# Patient Record
Sex: FEMALE | Race: WHITE | NOT HISPANIC OR LATINO
[De-identification: names, ages, dates, MRNs, and addresses within clinical notes are randomized per-mention and may not be internally consistent; named-entity substitution may affect disease eponyms.]

---

## 2020-11-17 ENCOUNTER — TRANSCRIPTION ENCOUNTER (OUTPATIENT)
Age: 25
End: 2020-11-17

## 2020-12-02 ENCOUNTER — TRANSCRIPTION ENCOUNTER (OUTPATIENT)
Age: 25
End: 2020-12-02

## 2020-12-27 ENCOUNTER — TRANSCRIPTION ENCOUNTER (OUTPATIENT)
Age: 25
End: 2020-12-27

## 2021-03-05 ENCOUNTER — INPATIENT (INPATIENT)
Facility: HOSPITAL | Age: 26
LOS: 2 days | Discharge: HOME | DRG: 694 | End: 2021-03-08
Attending: FAMILY MEDICINE | Admitting: FAMILY MEDICINE
Payer: COMMERCIAL

## 2021-03-05 VITALS — WEIGHT: 110.01 LBS | HEIGHT: 63 IN

## 2021-03-05 DIAGNOSIS — N20.0 CALCULUS OF KIDNEY: ICD-10-CM

## 2021-03-05 DIAGNOSIS — Z90.89 ACQUIRED ABSENCE OF OTHER ORGANS: Chronic | ICD-10-CM

## 2021-03-05 LAB
ABO RH CONFIRMATION: SIGNIFICANT CHANGE UP
ALBUMIN SERPL ELPH-MCNC: 4.4 G/DL — SIGNIFICANT CHANGE UP (ref 3.3–5)
ALP SERPL-CCNC: 96 U/L — SIGNIFICANT CHANGE UP (ref 40–120)
ALT FLD-CCNC: 18 U/L — SIGNIFICANT CHANGE UP (ref 12–78)
ANION GAP SERPL CALC-SCNC: 9 MMOL/L — SIGNIFICANT CHANGE UP (ref 5–17)
APPEARANCE UR: ABNORMAL
APTT BLD: 30.2 SEC — SIGNIFICANT CHANGE UP (ref 27.5–35.5)
AST SERPL-CCNC: 12 U/L — LOW (ref 15–37)
BASOPHILS # BLD AUTO: 0.09 K/UL — SIGNIFICANT CHANGE UP (ref 0–0.2)
BASOPHILS NFR BLD AUTO: 0.5 % — SIGNIFICANT CHANGE UP (ref 0–2)
BILIRUB SERPL-MCNC: 0.6 MG/DL — SIGNIFICANT CHANGE UP (ref 0.2–1.2)
BILIRUB UR-MCNC: NEGATIVE — SIGNIFICANT CHANGE UP
BUN SERPL-MCNC: 10 MG/DL — SIGNIFICANT CHANGE UP (ref 7–23)
CALCIUM SERPL-MCNC: 9.4 MG/DL — SIGNIFICANT CHANGE UP (ref 8.5–10.1)
CHLORIDE SERPL-SCNC: 105 MMOL/L — SIGNIFICANT CHANGE UP (ref 96–108)
CO2 SERPL-SCNC: 24 MMOL/L — SIGNIFICANT CHANGE UP (ref 22–31)
COLOR SPEC: YELLOW — SIGNIFICANT CHANGE UP
CREAT SERPL-MCNC: 0.9 MG/DL — SIGNIFICANT CHANGE UP (ref 0.5–1.3)
DIFF PNL FLD: ABNORMAL
EOSINOPHIL # BLD AUTO: 0.11 K/UL — SIGNIFICANT CHANGE UP (ref 0–0.5)
EOSINOPHIL NFR BLD AUTO: 0.7 % — SIGNIFICANT CHANGE UP (ref 0–6)
GLUCOSE SERPL-MCNC: 119 MG/DL — HIGH (ref 70–99)
GLUCOSE UR QL: NEGATIVE MG/DL — SIGNIFICANT CHANGE UP
HCT VFR BLD CALC: 45.4 % — HIGH (ref 34.5–45)
HGB BLD-MCNC: 15.8 G/DL — HIGH (ref 11.5–15.5)
IMM GRANULOCYTES NFR BLD AUTO: 0.4 % — SIGNIFICANT CHANGE UP (ref 0–1.5)
INR BLD: 1.12 RATIO — SIGNIFICANT CHANGE UP (ref 0.88–1.16)
KETONES UR-MCNC: ABNORMAL
LACTATE SERPL-SCNC: 1.1 MMOL/L — SIGNIFICANT CHANGE UP (ref 0.7–2)
LEUKOCYTE ESTERASE UR-ACNC: ABNORMAL
LYMPHOCYTES # BLD AUTO: 1.48 K/UL — SIGNIFICANT CHANGE UP (ref 1–3.3)
LYMPHOCYTES # BLD AUTO: 8.8 % — LOW (ref 13–44)
MCHC RBC-ENTMCNC: 32 PG — SIGNIFICANT CHANGE UP (ref 27–34)
MCHC RBC-ENTMCNC: 34.8 GM/DL — SIGNIFICANT CHANGE UP (ref 32–36)
MCV RBC AUTO: 91.9 FL — SIGNIFICANT CHANGE UP (ref 80–100)
MONOCYTES # BLD AUTO: 0.43 K/UL — SIGNIFICANT CHANGE UP (ref 0–0.9)
MONOCYTES NFR BLD AUTO: 2.6 % — SIGNIFICANT CHANGE UP (ref 2–14)
NEUTROPHILS # BLD AUTO: 14.6 K/UL — HIGH (ref 1.8–7.4)
NEUTROPHILS NFR BLD AUTO: 87 % — HIGH (ref 43–77)
NITRITE UR-MCNC: POSITIVE
PH UR: 5 — SIGNIFICANT CHANGE UP (ref 5–8)
PLATELET # BLD AUTO: 254 K/UL — SIGNIFICANT CHANGE UP (ref 150–400)
POTASSIUM SERPL-MCNC: 3.9 MMOL/L — SIGNIFICANT CHANGE UP (ref 3.5–5.3)
POTASSIUM SERPL-SCNC: 3.9 MMOL/L — SIGNIFICANT CHANGE UP (ref 3.5–5.3)
PROT SERPL-MCNC: 8.7 GM/DL — HIGH (ref 6–8.3)
PROT UR-MCNC: 30 MG/DL
PROTHROM AB SERPL-ACNC: 13 SEC — SIGNIFICANT CHANGE UP (ref 10.6–13.6)
RBC # BLD: 4.94 M/UL — SIGNIFICANT CHANGE UP (ref 3.8–5.2)
RBC # FLD: 11.6 % — SIGNIFICANT CHANGE UP (ref 10.3–14.5)
SARS-COV-2 RNA SPEC QL NAA+PROBE: SIGNIFICANT CHANGE UP
SODIUM SERPL-SCNC: 138 MMOL/L — SIGNIFICANT CHANGE UP (ref 135–145)
SP GR SPEC: 1.02 — SIGNIFICANT CHANGE UP (ref 1.01–1.02)
UROBILINOGEN FLD QL: 1 MG/DL
WBC # BLD: 16.77 K/UL — HIGH (ref 3.8–10.5)
WBC # FLD AUTO: 16.77 K/UL — HIGH (ref 3.8–10.5)

## 2021-03-05 PROCEDURE — 74177 CT ABD & PELVIS W/CONTRAST: CPT | Mod: 26

## 2021-03-05 PROCEDURE — 76830 TRANSVAGINAL US NON-OB: CPT | Mod: 26

## 2021-03-05 PROCEDURE — 71046 X-RAY EXAM CHEST 2 VIEWS: CPT | Mod: 26

## 2021-03-05 PROCEDURE — 83605 ASSAY OF LACTIC ACID: CPT

## 2021-03-05 PROCEDURE — 76705 ECHO EXAM OF ABDOMEN: CPT | Mod: 26

## 2021-03-05 PROCEDURE — 85025 COMPLETE CBC W/AUTO DIFF WBC: CPT

## 2021-03-05 PROCEDURE — 80048 BASIC METABOLIC PNL TOTAL CA: CPT

## 2021-03-05 PROCEDURE — 76770 US EXAM ABDO BACK WALL COMP: CPT

## 2021-03-05 PROCEDURE — G0378: CPT

## 2021-03-05 PROCEDURE — 93010 ELECTROCARDIOGRAM REPORT: CPT

## 2021-03-05 PROCEDURE — 87040 BLOOD CULTURE FOR BACTERIA: CPT

## 2021-03-05 PROCEDURE — 99223 1ST HOSP IP/OBS HIGH 75: CPT | Mod: GC

## 2021-03-05 PROCEDURE — 36415 COLL VENOUS BLD VENIPUNCTURE: CPT

## 2021-03-05 PROCEDURE — 74018 RADEX ABDOMEN 1 VIEW: CPT

## 2021-03-05 RX ORDER — CEFTRIAXONE 500 MG/1
1000 INJECTION, POWDER, FOR SOLUTION INTRAMUSCULAR; INTRAVENOUS ONCE
Refills: 0 | Status: COMPLETED | OUTPATIENT
Start: 2021-03-05 | End: 2021-03-05

## 2021-03-05 RX ORDER — SODIUM CHLORIDE 9 MG/ML
1000 INJECTION INTRAMUSCULAR; INTRAVENOUS; SUBCUTANEOUS
Refills: 0 | Status: COMPLETED | OUTPATIENT
Start: 2021-03-05 | End: 2021-03-06

## 2021-03-05 RX ORDER — AZITHROMYCIN 500 MG/1
500 TABLET, FILM COATED ORAL ONCE
Refills: 0 | Status: COMPLETED | OUTPATIENT
Start: 2021-03-05 | End: 2021-03-05

## 2021-03-05 RX ORDER — CEFTRIAXONE 500 MG/1
1000 INJECTION, POWDER, FOR SOLUTION INTRAMUSCULAR; INTRAVENOUS ONCE
Refills: 0 | Status: DISCONTINUED | OUTPATIENT
Start: 2021-03-05 | End: 2021-03-05

## 2021-03-05 RX ORDER — ACETAMINOPHEN 500 MG
650 TABLET ORAL EVERY 6 HOURS
Refills: 0 | Status: DISCONTINUED | OUTPATIENT
Start: 2021-03-05 | End: 2021-03-08

## 2021-03-05 RX ORDER — KETOROLAC TROMETHAMINE 30 MG/ML
15 SYRINGE (ML) INJECTION EVERY 6 HOURS
Refills: 0 | Status: DISCONTINUED | OUTPATIENT
Start: 2021-03-05 | End: 2021-03-08

## 2021-03-05 RX ORDER — ONDANSETRON 8 MG/1
4 TABLET, FILM COATED ORAL ONCE
Refills: 0 | Status: COMPLETED | OUTPATIENT
Start: 2021-03-05 | End: 2021-03-05

## 2021-03-05 RX ORDER — KETOROLAC TROMETHAMINE 30 MG/ML
30 SYRINGE (ML) INJECTION EVERY 6 HOURS
Refills: 0 | Status: DISCONTINUED | OUTPATIENT
Start: 2021-03-05 | End: 2021-03-08

## 2021-03-05 RX ORDER — SODIUM CHLORIDE 9 MG/ML
1000 INJECTION INTRAMUSCULAR; INTRAVENOUS; SUBCUTANEOUS ONCE
Refills: 0 | Status: COMPLETED | OUTPATIENT
Start: 2021-03-05 | End: 2021-03-05

## 2021-03-05 RX ORDER — ONDANSETRON 8 MG/1
4 TABLET, FILM COATED ORAL EVERY 6 HOURS
Refills: 0 | Status: DISCONTINUED | OUTPATIENT
Start: 2021-03-05 | End: 2021-03-08

## 2021-03-05 RX ADMIN — SODIUM CHLORIDE 2000 MILLILITER(S): 9 INJECTION INTRAMUSCULAR; INTRAVENOUS; SUBCUTANEOUS at 16:57

## 2021-03-05 RX ADMIN — ONDANSETRON 4 MILLIGRAM(S): 8 TABLET, FILM COATED ORAL at 16:56

## 2021-03-05 RX ADMIN — CEFTRIAXONE 1000 MILLIGRAM(S): 500 INJECTION, POWDER, FOR SOLUTION INTRAMUSCULAR; INTRAVENOUS at 23:00

## 2021-03-05 RX ADMIN — AZITHROMYCIN 255 MILLIGRAM(S): 500 TABLET, FILM COATED ORAL at 23:56

## 2021-03-05 NOTE — H&P ADULT - NSHPLABSRESULTS_GEN_ALL_CORE
< from: CT Abdomen and Pelvis w/ Oral Cont and w/ IV Cont (03.05.21 @ 19:14) >    FINDINGS:    LOWER CHEST: There is partially imaged atelectasis involving the lingula. There are opacities which are partially imaged at the right lung base with central lobular nodules compatible with pneumonia.    ABDOMEN:  LIVER: within normal limits.  BILE DUCTS: normal caliber.  GALLBLADDER: No calcified gallstones. Normal caliber wall.  PANCREAS: within normal limits.  SPLEEN: within normal limits.  ADRENALS: within normal limits.  KIDNEYS: Mild right hydroureteronephrosis secondary to a 3 mm stone in the proximal right ureter.    PELVIS:  REPRODUCTIVE ORGANS: no pelvic masses.  BLADDER: within normal limits.    BOWEL: No bowel obstruction or bowel wall thickening. Appendix is unremarkable. No enlarged mesenteric lymph nodes.  PERITONEUM: no ascites or free air, no fluid collection.  VESSELS: within normal limits.  RETROPERITONEUM: within normal limits.  ABDOMINAL WALL: within normal limits.  BONES: within normal limits.    IMPRESSION:  Mild right hydroureteronephrosis secondary to a 3 mm stone in the proximal right ureter.    Right lower lobe pneumonia is partially imaged. Partially imaged atelectasis right middle lobe. Further evaluation with dedicated chest radiograph or CT scan recommended.

## 2021-03-05 NOTE — H&P ADULT - ATTENDING COMMENTS
26 y/o F with no known PMHx presents to  for further evaluation and management od c/o lower abdominal/right flank pain consistent with an acute obstructive uropathy.  ~admit to Medicine  ~CT ABD/Pelvis was significant for a right hydronephrosis due to 3mm stone in proximal right ureter  ~NPO for now  ~cont. IV hydration  ~strict I/Os  ~cont. analgesia as described above   ~cont. Tamsulosin 0.4mg po daily   ~f/u w/ Urology in the am as patient may require a ureteral stent     #UTI  ~f/u PAN C+S  ~cont. Ceftriaxone 1g IVP daily    # RML/RLL consolidation vs. Atelectasis  ~as above noted on CT abdomen  ~cont. management as outlined above  #Vte ppx  ~IMPROVE Vte Risk Score is 0  ~encourage ambulation

## 2021-03-05 NOTE — ED STATDOCS - CARE PLAN
Principal Discharge DX:	Renal stone  Secondary Diagnosis:	Hydronephrosis with urinary obstruction due to ureteral calculus  Secondary Diagnosis:	Acute cystitis with hematuria  Secondary Diagnosis:	Pneumonia of right lower lobe due to infectious organism

## 2021-03-05 NOTE — H&P ADULT - ASSESSMENT
25 year old F w/ no significant PMH complaining of abdominal pain admitted for treatment of nephrolithiasis.     #Abdominal pain 2/2 nephrolithiasis  - CT abdomen significant for mild right  hydronephrosis due to 3mm stone in proximal right ureter.  Appendix unremarkable.   - patient received 1L NS in ED  - will order 1L NS at 100cc/hr x 1 dose. Patient's fluid status will need to be reevaluated in the AM.   - Analgesia: Tylenol 650mg PRN mild pain/fever, Toradol 15mg q6 PRN Moderate pain, Toradol 30mg q6 PRN severe pain  - Please strain urine - will need to send stone for analysis as this is patient's first episode of nephrolithiasis  - will consult urology re: possible surgical intervention    #Possible UTI  -pt did not meet SIRS criteria on admission  - U/A significant for Large blood, ketone, nitrates, bacteria and trace Leuk esterase; WBC 16  - patient denies urinary symptoms, however renal stone can act as source of infection  - will f/u urine culture  - will continue Rocephin 1G daily    # RML/RLL consolidation vs. Atelectasis  - noted on CT abdomen  - patient states that she has non-productive cough since December that she has treated with Flonase prescribed by PCP  - Likely atelectasis 2/2 poor inspiratory effort due to pain from nephrolithiasis    # Elevated hemoglobin  - may be due to hemoconcentration as patient had decreased PO intake prior to admission  - continue NS @ 100cc/hr and recheck CBC in AM    #Diet : Regular    #Code Status: Discussed with patient at admission. Patient wishes to be FULL CODE during this admission.     #DVT Prophylaxis: Encourage ambulation; SCD    IMPROVE VTE Individual Risk Assessment    RISK                                                                Points    [  ] Previous VTE                                                  3    [  ] Thrombophilia                                               2    [  ] Lower limb paralysis                                      2        (unable to hold up >15 seconds)      [  ] Current Cancer                                              2         (within 6 months)    [  ] Immobilization > 24 hrs                                1    [  ] ICU/CCU stay > 24 hours                              1    [  ] Age > 60                                                      1    IMPROVE VTE Score _______0__    IMPROVE Score 0-1: Low Risk, No VTE prophylaxis required for most patients, encourage ambulation.   IMPROVE Score 2-3: At risk, pharmacologic VTE prophylaxis is indicated for most patients (in the absence of a contraindication)  IMPROVE Score > or = 4: High Risk, pharmacologic VTE prophylaxis is indicated for most patients (in the absence of a contraindication)      Case discussed with Dr. Henao.      25 year old F w/ no significant PMH complaining of abdominal pain admitted for treatment of nephrolithiasis.     #Abdominal pain 2/2 nephrolithiasis  - CT abdomen significant for mild right  hydronephrosis due to 3mm stone in proximal right ureter.  Appendix unremarkable.   - patient received 1L NS in ED  - will order 1L NS at 100cc/hr x 1 dose. Patient's fluid status will need to be reevaluated in the AM.   - Analgesia: Tylenol 650mg PRN mild pain/fever, Toradol 15mg q6 PRN Moderate pain, Toradol 30mg q6 PRN severe pain  - Please strain urine - will need to send stone for analysis as this is patient's first episode of nephrolithiasis  - will consult urology re: possible surgical intervention    #Possible UTI  -pt did not meet SIRS criteria on admission  - U/A significant for Large blood, ketone, nitrates, bacteria and trace Leuk esterase; WBC 16  - patient denies urinary symptoms, however renal stone can act as source of infection  - will f/u urine culture  - will continue Rocephin 1G daily    # RML/RLL consolidation vs. Atelectasis  - noted on CT abdomen  - patient states that she has non-productive cough since December that she has treated with Flonase prescribed by PCP  - Likely atelectasis 2/2 poor inspiratory effort due to pain from nephrolithiasis  - f/u final read of CXR   - if patient develops productive cough can consider CT chest     # Elevated hemoglobin  - may be due to hemoconcentration as patient had decreased PO intake prior to admission  - continue NS @ 100cc/hr and recheck CBC in AM    #Diet : Regular    #Code Status: Discussed with patient at admission. Patient wishes to be FULL CODE during this admission.     #DVT Prophylaxis: Encourage ambulation; SCD    IMPROVE VTE Individual Risk Assessment    RISK                                                                Points    [  ] Previous VTE                                                  3    [  ] Thrombophilia                                               2    [  ] Lower limb paralysis                                      2        (unable to hold up >15 seconds)      [  ] Current Cancer                                              2         (within 6 months)    [  ] Immobilization > 24 hrs                                1    [  ] ICU/CCU stay > 24 hours                              1    [  ] Age > 60                                                      1    IMPROVE VTE Score _______0__    IMPROVE Score 0-1: Low Risk, No VTE prophylaxis required for most patients, encourage ambulation.   IMPROVE Score 2-3: At risk, pharmacologic VTE prophylaxis is indicated for most patients (in the absence of a contraindication)  IMPROVE Score > or = 4: High Risk, pharmacologic VTE prophylaxis is indicated for most patients (in the absence of a contraindication)      Case discussed with Dr. Henao.      25 year old F w/ no significant PMH complaining of abdominal pain admitted for treatment of nephrolithiasis.     #Abdominal pain 2/2 nephrolithiasis  - CT abdomen significant for mild right  hydronephrosis due to 3mm stone in proximal right ureter.  Appendix unremarkable.   - patient received 1L NS in ED  - will order 1L NS at 100cc/hr x 1 dose. Patient's fluid status will need to be reevaluated in the AM.   - Analgesia: Tylenol 650mg PRN mild pain/fever, Toradol 15mg q6 PRN Moderate pain, Toradol 30mg q6 PRN severe pain  - Please strain urine - will need to send stone for analysis as this is patient's first episode of nephrolithiasis  - will give Flomax x1 now  - will consult urology re: possible surgical intervention    #Possible UTI  -pt did not meet SIRS criteria on admission  - U/A significant for Large blood, ketone, nitrates, bacteria and trace Leuk esterase; WBC 16  - patient denies urinary symptoms, however renal stone can act as source of infection  - will f/u urine culture  - will continue Rocephin 1G daily    # RML/RLL consolidation vs. Atelectasis  - noted on CT abdomen  - patient states that she has non-productive cough since December that she has treated with Flonase prescribed by PCP  - Likely atelectasis 2/2 poor inspiratory effort due to pain from nephrolithiasis  - f/u final read of CXR   - will continue Rocephin 1G daily and Azithro 500mg daily    # Elevated hemoglobin  - may be due to hemoconcentration as patient had decreased PO intake prior to admission  - continue NS @ 100cc/hr and recheck CBC in AM    #Diet : Regular    #Code Status: Discussed with patient at admission. Patient wishes to be FULL CODE during this admission.     #DVT Prophylaxis: Encourage ambulation; SCD    IMPROVE VTE Individual Risk Assessment    RISK                                                                Points    [  ] Previous VTE                                                  3    [  ] Thrombophilia                                               2    [  ] Lower limb paralysis                                      2        (unable to hold up >15 seconds)      [  ] Current Cancer                                              2         (within 6 months)    [  ] Immobilization > 24 hrs                                1    [  ] ICU/CCU stay > 24 hours                              1    [  ] Age > 60                                                      1    IMPROVE VTE Score _______0__    IMPROVE Score 0-1: Low Risk, No VTE prophylaxis required for most patients, encourage ambulation.   IMPROVE Score 2-3: At risk, pharmacologic VTE prophylaxis is indicated for most patients (in the absence of a contraindication)  IMPROVE Score > or = 4: High Risk, pharmacologic VTE prophylaxis is indicated for most patients (in the absence of a contraindication)      Case discussed with Dr. Henao.

## 2021-03-05 NOTE — ED ADULT NURSE NOTE - OBJECTIVE STATEMENT
pt presents to the ED c/o abd pain onset this morning. Pt also reports vomiting x3 times. Denies diarrhea. Pain located to RLQ. LNMP 2/25/21. 18 g placed to left AC. labs sent.

## 2021-03-05 NOTE — ED STATDOCS - OBJECTIVE STATEMENT
24 y/o female with no pertinent PMHx presents to the ED c/o abd pain onset this morning. Pt also reports vomiting x3 times. Denies diarrhea. Pain located to RLQ. LNMP 2/25/21. No known history fo ovarian cysts. Allergic to penicillin, codeine.

## 2021-03-05 NOTE — ED STATDOCS - SECONDARY DIAGNOSIS.
Pneumonia of right lower lobe due to infectious organism Acute cystitis with hematuria Hydronephrosis with urinary obstruction due to ureteral calculus

## 2021-03-05 NOTE — ED STATDOCS - PROGRESS NOTE DETAILS
6 y/o female with no pertinent PMHx presents to the ED c/o abd pain onset this morning. Pt also reports vomiting x3 times. Denies diarrhea. Pain located to RLQ.   LMP 2/25/21.  Will f/U labs/ US.  discussed with CT tech to start PO contrast now.  Wait for US readings prior to scan.  Scarlett Gallardo PA-C Reports nausea improved s/p med.  Pt with pain, but she does not want any meds at this time.  Felt upset stomach for last 2-3 days, then pain worsened and travelled to [facility] this morning.  Ate granola bar this morning.  No other food.  Last vomit in ED 1.5 hours ago, prior to med.  (+) anorexia, pain increased with hitting bumps in car.  On exam, active Bs x4, Soft, (+) RLQ tenderness.  (+) Mcburney, obturator, rovsings' and iliopsoas signs.  Neg CVA tenderness to perc.  WBC elevated.  US pending.  PT drinking contrast for CT.  Scarlett Gallardo PA-C

## 2021-03-05 NOTE — H&P ADULT - HISTORY OF PRESENT ILLNESS
Ms. Souza is a 25 year old F with no significant PMH presented to ED complaining of lower abdominal pain. Patient states that the pain began 2 days ago and at that time it was an intermittent cramping pain which she believed was indigestion The morning of admission, patient developed sharp tara-umbilical pain 7/10 pain that eventually radiated to her RLQ and R flank. Pain was associated with nausea, 4 episodes of non-bilious non-bloody emesis and decreased appetite.  Ms. Souza is a 25 year old F with no significant PMH presented to ED complaining of lower abdominal pain. Patient states that the pain began 2 days ago and at that time it was an intermittent cramping pain which she believed was indigestion The morning of admission, patient developed sharp tara-umbilical pain 7/10 pain that eventually radiated to her RLQ and R flank. Pain was associated with nausea, 4 episodes of non-bilious non-bloody emesis and decreased appetite. Pain was associated with chills the morning of admission, patient denies any fevers.  She denies dysuria, foul smelling urine or frequency prior to onset of pain.     In ED, Ms. Souza received Azithro 500mg IV x 1, Rocephin 1G x1 and NS 1L bolus.

## 2021-03-05 NOTE — H&P ADULT - NSHPPHYSICALEXAM_GEN_ALL_CORE
Vitals  T(F): 98.3 (03-05-21 @ 22:02), Max: 98.5 (03-05-21 @ 20:37)  HR: 84 (03-05-21 @ 20:37) (84 - 88)  BP: 125/62 (03-05-21 @ 20:37) (125/62 - 138/76)  RR: 16 (03-05-21 @ 20:37) (16 - 18)  SpO2: 97% (03-05-21 @ 20:37) (97% - 100%)    Physical Exam   Gen: NAD, comfortable  HENT: atraumatic head and ears, no gross abnormalities of ears, mucous membranes moist, no oral lesions, neck supple without masses/goiter/lymphadenopathy  CV: RRR, nl s1/s2, no M/R/G  Pulm: nl respiratory effort, CTAB, no wheezes/crackles/rhonchi  Back: no scoliosis, lordosis, or kyphosis, no tenderness  Abd: normoactive bowel sounds in all 4 quadrants, soft, nontender, nondistended, no rebound, no guarding, no masses  Extremities: no pedal edema, pedal pulses palpable   Skin: nl warm and dry, no wounds   Neuro: A&Ox3, answering questions appropriately, PERRL, EOMI, face symmetric, sensation equal bilaterally in face, tongue midline, no dysarthria, 5/5 strength in upper and lower extremities bilaterally, sensation intact in upper and lower extremities bilaterally, nl finger to nose, and nl heel to shin   Pysch: no depression, no SI, no HI Vitals  T(F): 98.3 (03-05-21 @ 22:02), Max: 98.5 (03-05-21 @ 20:37)  HR: 84 (03-05-21 @ 20:37) (84 - 88)  BP: 125/62 (03-05-21 @ 20:37) (125/62 - 138/76)  RR: 16 (03-05-21 @ 20:37) (16 - 18)  SpO2: 97% (03-05-21 @ 20:37) (97% - 100%)    Physical Exam   Gen: NAD, comfortable, sitting upright in chair  HENT: atraumatic head and ears, mucous membranes moist, no oral lesions, neck supple without lymphadenopathy  CV: physiologic s1,s2; no murmurs auscultated  Pulm: good respiratory effort, CTABL, no rhonchi auscultated  Back: no scoliosis, lordosis, or kyphosis, no CVA tenderness  Abd: normoactive bowel sounds, soft, tender to deep palpation RLQ, nondistended, no rebound, no guarding  Extremities: no pedal edema, pedal pulses palpable   Skin: nl warm and dry, no wounds   Neuro: A&Ox3, answering questions appropriately, PERRL, EOMI, 5/5 strength in upper and lower extremities bilaterally, sensation intact in upper and lower extremities bilaterally, normal gait Vitals  T(F): 98.3 (03-05-21 @ 22:02), Max: 98.5 (03-05-21 @ 20:37)  HR: 84 (03-05-21 @ 20:37) (84 - 88)  BP: 125/62 (03-05-21 @ 20:37) (125/62 - 138/76)  RR: 16 (03-05-21 @ 20:37) (16 - 18)  SpO2: 97% (03-05-21 @ 20:37) (97% - 100%)    Physical Exam   Gen: NAD, comfortable, sitting upright in chair  HEENT: atraumatic head and ears, mucous membranes moist, no oral lesions, neck supple without lymphadenopathy  CV: physiologic s1,s2; no murmurs auscultated  Pulm: good respiratory effort, CTABL, no rhonchi auscultated  Back: no scoliosis, lordosis, or kyphosis, no CVA tenderness  Abd: normoactive bowel sounds, soft, tender to deep palpation RLQ, nondistended, no rebound, no guarding  Extremities: no pedal edema, pedal pulses palpable   Skin: nl warm and dry, no wounds   Neuro: A&Ox3, answering questions appropriately, PERRL, EOMI, 5/5 strength in upper and lower extremities bilaterally, sensation intact in upper and lower extremities bilaterally, normal gait

## 2021-03-06 LAB
ANION GAP SERPL CALC-SCNC: 7 MMOL/L — SIGNIFICANT CHANGE UP (ref 5–17)
BASOPHILS # BLD AUTO: 0.05 K/UL — SIGNIFICANT CHANGE UP (ref 0–0.2)
BASOPHILS NFR BLD AUTO: 0.4 % — SIGNIFICANT CHANGE UP (ref 0–2)
BUN SERPL-MCNC: 6 MG/DL — LOW (ref 7–23)
CALCIUM SERPL-MCNC: 8.6 MG/DL — SIGNIFICANT CHANGE UP (ref 8.5–10.1)
CHLORIDE SERPL-SCNC: 111 MMOL/L — HIGH (ref 96–108)
CO2 SERPL-SCNC: 24 MMOL/L — SIGNIFICANT CHANGE UP (ref 22–31)
CREAT SERPL-MCNC: 0.61 MG/DL — SIGNIFICANT CHANGE UP (ref 0.5–1.3)
EOSINOPHIL # BLD AUTO: 0.69 K/UL — HIGH (ref 0–0.5)
EOSINOPHIL NFR BLD AUTO: 5.3 % — SIGNIFICANT CHANGE UP (ref 0–6)
GLUCOSE SERPL-MCNC: 84 MG/DL — SIGNIFICANT CHANGE UP (ref 70–99)
HCT VFR BLD CALC: 37.5 % — SIGNIFICANT CHANGE UP (ref 34.5–45)
HGB BLD-MCNC: 13 G/DL — SIGNIFICANT CHANGE UP (ref 11.5–15.5)
IMM GRANULOCYTES NFR BLD AUTO: 0.4 % — SIGNIFICANT CHANGE UP (ref 0–1.5)
LYMPHOCYTES # BLD AUTO: 27.2 % — SIGNIFICANT CHANGE UP (ref 13–44)
LYMPHOCYTES # BLD AUTO: 3.56 K/UL — HIGH (ref 1–3.3)
MCHC RBC-ENTMCNC: 32.1 PG — SIGNIFICANT CHANGE UP (ref 27–34)
MCHC RBC-ENTMCNC: 34.7 GM/DL — SIGNIFICANT CHANGE UP (ref 32–36)
MCV RBC AUTO: 92.6 FL — SIGNIFICANT CHANGE UP (ref 80–100)
MONOCYTES # BLD AUTO: 0.97 K/UL — HIGH (ref 0–0.9)
MONOCYTES NFR BLD AUTO: 7.4 % — SIGNIFICANT CHANGE UP (ref 2–14)
NEUTROPHILS # BLD AUTO: 7.78 K/UL — HIGH (ref 1.8–7.4)
NEUTROPHILS NFR BLD AUTO: 59.3 % — SIGNIFICANT CHANGE UP (ref 43–77)
PLATELET # BLD AUTO: 200 K/UL — SIGNIFICANT CHANGE UP (ref 150–400)
POTASSIUM SERPL-MCNC: 3.9 MMOL/L — SIGNIFICANT CHANGE UP (ref 3.5–5.3)
POTASSIUM SERPL-SCNC: 3.9 MMOL/L — SIGNIFICANT CHANGE UP (ref 3.5–5.3)
RBC # BLD: 4.05 M/UL — SIGNIFICANT CHANGE UP (ref 3.8–5.2)
RBC # FLD: 11.7 % — SIGNIFICANT CHANGE UP (ref 10.3–14.5)
SARS-COV-2 IGG SERPL QL IA: NEGATIVE — SIGNIFICANT CHANGE UP
SARS-COV-2 IGM SERPL IA-ACNC: 0.07 INDEX — SIGNIFICANT CHANGE UP
SODIUM SERPL-SCNC: 142 MMOL/L — SIGNIFICANT CHANGE UP (ref 135–145)
WBC # BLD: 13.1 K/UL — HIGH (ref 3.8–10.5)
WBC # FLD AUTO: 13.1 K/UL — HIGH (ref 3.8–10.5)

## 2021-03-06 PROCEDURE — 99233 SBSQ HOSP IP/OBS HIGH 50: CPT | Mod: GC

## 2021-03-06 RX ORDER — TAMSULOSIN HYDROCHLORIDE 0.4 MG/1
0.8 CAPSULE ORAL ONCE
Refills: 0 | Status: COMPLETED | OUTPATIENT
Start: 2021-03-06 | End: 2021-03-06

## 2021-03-06 RX ORDER — CEFTRIAXONE 500 MG/1
1000 INJECTION, POWDER, FOR SOLUTION INTRAMUSCULAR; INTRAVENOUS
Refills: 0 | Status: DISCONTINUED | OUTPATIENT
Start: 2021-03-06 | End: 2021-03-07

## 2021-03-06 RX ORDER — AZITHROMYCIN 500 MG/1
500 TABLET, FILM COATED ORAL EVERY 24 HOURS
Refills: 0 | Status: DISCONTINUED | OUTPATIENT
Start: 2021-03-06 | End: 2021-03-07

## 2021-03-06 RX ADMIN — TAMSULOSIN HYDROCHLORIDE 0.8 MILLIGRAM(S): 0.4 CAPSULE ORAL at 06:06

## 2021-03-06 RX ADMIN — Medication 650 MILLIGRAM(S): at 03:21

## 2021-03-06 RX ADMIN — CEFTRIAXONE 1000 MILLIGRAM(S): 500 INJECTION, POWDER, FOR SOLUTION INTRAMUSCULAR; INTRAVENOUS at 22:29

## 2021-03-06 RX ADMIN — Medication 650 MILLIGRAM(S): at 02:23

## 2021-03-06 RX ADMIN — AZITHROMYCIN 255 MILLIGRAM(S): 500 TABLET, FILM COATED ORAL at 22:30

## 2021-03-06 RX ADMIN — ONDANSETRON 4 MILLIGRAM(S): 8 TABLET, FILM COATED ORAL at 02:01

## 2021-03-06 RX ADMIN — SODIUM CHLORIDE 100 MILLILITER(S): 9 INJECTION INTRAMUSCULAR; INTRAVENOUS; SUBCUTANEOUS at 02:00

## 2021-03-06 NOTE — PROGRESS NOTE ADULT - ATTENDING COMMENTS
-[id number]-aeeb, cta  -p/a-soft, bs+  -cvs-s1s2 normal     A/P    #ct iv fluids, monitoring, urology evaluation

## 2021-03-06 NOTE — PROGRESS NOTE ADULT - ASSESSMENT
25 year old F w/ no significant PMH complaining of abdominal pain admitted for treatment of nephrolithiasis.     #Renal Colic 2/2 R kidney nephrolithiasis  -CT abdomen significant for mild right hydronephrosis, 3mm stone in proximal right ureter  -S/p IVF 1L NS in ED  -Continue NS at 100cc/hr  -Continue Tylenol 650mg PRN mild pain/fever, Toradol 15mg q6 PRN Moderate pain, Toradol 30mg q6 PRN severe pain  -Strain urine - will need to send stone for analysis as this is patient's first episode of nephrolithiasis  -F/u urology consult     #UTI  -U/A significant for Large blood, ketone, nitrates, bacteria and trace Leuk esterase; WBC 16  -No urinary symptoms reported   -F/u urine culture  -Continue ceftriaxone 1g q24hr    #RML Atelectasis/RLL PNA   -Noted on CT abdomen  -Likely atelectasis 2/2 poor inspiratory effort due to pain from nephrolithiasis  -CXR: R middle lobe airspace consolidation or PNA  -Continue Rocephin 1g daily and Azithromycin 500mg daily    #Code Status  -Full code     #DVT Prophylaxis  -Encourage ambulation; SCD      Above discussed with Dr. Marti

## 2021-03-06 NOTE — PATIENT PROFILE ADULT - STATED REASON FOR ADMISSION
Pt started having abdominal pain since 0800 on Friday and vomited x 3 at home and came to ED in the afternoon.  Pt also had chills but did not take temperature.

## 2021-03-06 NOTE — PROGRESS NOTE ADULT - SUBJECTIVE AND OBJECTIVE BOX
HPI:  Ms. Souza is a 25 year old F with no significant PMH presented to ED complaining of lower abdominal pain. Patient states that the pain began 2 days ago and at that time it was an intermittent cramping pain which she believed was indigestion The morning of admission, patient developed sharp tara-umbilical pain 7/10 pain that eventually radiated to her RLQ and R flank. Pain was associated with nausea, 4 episodes of non-bilious non-bloody emesis and decreased appetite. Pain was associated with chills the morning of admission, patient denies any fevers.  She denies dysuria, foul smelling urine or frequency prior to onset of pain.     In ED, Ms. Souza received Azithro 500mg IV x 1, Rocephin 1G x1 and NS 1L bolus.  (05 Mar 2021 23:40)      MEDICATIONS  (STANDING):  azithromycin  IVPB 500 milliGRAM(s) IV Intermittent every 24 hours  cefTRIAXone Injectable. 1000 milliGRAM(s) IV Push <User Schedule>    MEDICATIONS  (PRN):  acetaminophen   Tablet .. 650 milliGRAM(s) Oral every 6 hours PRN Temp greater or equal to 38C (100.4F), Mild Pain (1 - 3)  ketorolac   Injectable 15 milliGRAM(s) IV Push every 6 hours PRN Moderate Pain (4 - 6)  ketorolac   Injectable 30 milliGRAM(s) IV Push every 6 hours PRN Severe Pain (7 - 10)  ondansetron Injectable 4 milliGRAM(s) IV Push every 6 hours PRN Nausea      Vital Signs Last 24 Hrs  T(C): 36.9 (06 Mar 2021 10:00), Max: 36.9 (05 Mar 2021 20:37)  T(F): 98.5 (06 Mar 2021 10:00), Max: 98.5 (05 Mar 2021 20:37)  HR: 110 (06 Mar 2021 10:00) (74 - 110)  BP: 119/58 (06 Mar 2021 10:00) (119/58 - 138/76)  BP(mean): 96 (05 Mar 2021 15:49) (96 - 96)  RR: 20 (06 Mar 2021 10:00) (16 - 20)  SpO2: 96% (06 Mar 2021 10:00) (96% - 100%)    GEN: NAD, comfortable, resting in bed  HEENT: NC/AT, EOMI, PERRLA, MMM, clear conjunctiva and sclera, normal hearing, no nasal discharge, throat clear, no thrush, normal dentition.   NECK: supple, no JVD, no LAD, no thyromegaly  BACK:  ROM intact, no spinal/paraspinal tenderness  CV: S1S2, RRR, no mumur  RESP: good air movement, CTABL, no rales, rhonchi or wheezing, respirations unlabored  ABD: +BS, soft, ND, NT, no guarding, no rigidity, no HSM  EXT: +2 radial and pedal pulses, no edema, no calve tenderness  SKIN: No visible Rashes or Ulcers  MSK: ROM intact in all extremities  NEURO:  sensory and CN 2-12 Grossly intact, no motor deficits, no, saddle anesthesia, AOx3  PSYCH: normal behavior         LABS:                          13.0   13.10 )-----------( 200      ( 06 Mar 2021 07:12 )             37.5     06 Mar 2021 07:12    142    |  111    |  6      ----------------------------<  84     3.9     |  24     |  0.61     Ca    8.6        06 Mar 2021 07:12    TPro  8.7    /  Alb  4.4    /  TBili  0.6    /  DBili  x      /  AST  12     /  ALT  18     /  AlkPhos  96     05 Mar 2021 16:41    LIVER FUNCTIONS - ( 05 Mar 2021 16:41 )  Alb: 4.4 g/dL / Pro: 8.7 gm/dL / ALK PHOS: 96 U/L / ALT: 18 U/L / AST: 12 U/L / GGT: x           PT/INR - ( 05 Mar 2021 16:41 )   PT: 13.0 sec;   INR: 1.12 ratio         PTT - ( 05 Mar 2021 16:41 )  PTT:30.2 sec  CAPILLARY BLOOD GLUCOSE            Urinalysis Basic - ( 05 Mar 2021 16:41 )    Color: Yellow / Appearance: very cloudy / S.020 / pH: x  Gluc: x / Ketone: Large  / Bili: Negative / Urobili: 1 mg/dL   Blood: x / Protein: 30 mg/dL / Nitrite: Positive   Leuk Esterase: Trace / RBC: >50 /HPF / WBC 3-5   Sq Epi: x / Non Sq Epi: Occasional / Bacteria: Occasional        RADIOLOGY:         HPI: Ms. Souza is a 25 year old F with no significant PMH presented to ED complaining of lower abdominal pain. Patient states that the pain began 2 days prior to admisison and at that time it was an intermittent cramping pain which she believed was indigestion The morning of admission, patient developed sharp tara-umbilical pain 7/10 pain that eventually radiated to her RLQ and R flank. Pain was associated with nausea, 4 episodes of non-bilious non-bloody emesis and decreased appetite. Pain was associated with chills the morning of admission, patient denies any fevers. In ED, Ms. Souza received Azithro 500mg IV x 1, Rocephin 1G x1 and NS 1L bolus.  (05 Mar 2021 23:40)    Subjective: Patient seen and evaluated at bedside in no apparent distress, has pain of ~ 2/10 at this time, much improved since admission. Able to tolerate orally.     REVIEW OF SYSTEMS:    CONSTITUTIONAL: No weakness, fevers or chills  EYES/ENT: No visual changes;  No vertigo or throat pain   NECK: No pain or stiffness  RESPIRATORY: No cough, wheezing, hemoptysis; No shortness of breath  CARDIOVASCULAR: No chest pain or palpitations  GASTROINTESTINAL: +abdominal pain, no epigastric pain. No nausea, vomiting, or hematemesis; No diarrhea or constipation. No melena or hematochezia.  GENITOURINARY: No dysuria, frequency or hematuria  NEUROLOGICAL: No numbness or weakness  SKIN: No itching, rashes    Vital Signs Last 24 Hrs  T(C): 36.9 (06 Mar 2021 10:00), Max: 36.9 (05 Mar 2021 20:37)  T(F): 98.5 (06 Mar 2021 10:00), Max: 98.5 (05 Mar 2021 20:37)  HR: 110 (06 Mar 2021 10:00) (74 - 110)  BP: 119/58 (06 Mar 2021 10:00) (119/58 - 138/76)  BP(mean): 96 (05 Mar 2021 15:49) (96 - 96)  RR: 20 (06 Mar 2021 10:00) (16 - 20)  SpO2: 96% (06 Mar 2021 10:00) (96% - 100%)    PHYSICAL EXAM:  GENERAL: NAD, lying in bed comfortably  HEAD:  Atraumatic, Normocephalic  EYES: conjunctiva and sclera clear  ENT: Moist mucous membranes  NECK: Supple, No JVD  CHEST/LUNG: Clear to auscultation bilaterally; No rales, rhonchi, wheezing. Unlabored respirations  HEART: Regular rate and rhythm; No murmurs, rubs, or gallops  ABDOMEN: Bowel sounds present; Soft, Nontender, Nondistended.   EXTREMITIES:  2+ Peripheral Pulses, brisk capillary refill. No clubbing, cyanosis, or edema  NERVOUS SYSTEM:  Alert & Oriented X3, speech clear. No deficits   MSK: FROM all 4 extremities    MEDICATIONS  (STANDING):  azithromycin  IVPB 500 milliGRAM(s) IV Intermittent every 24 hours  cefTRIAXone Injectable. 1000 milliGRAM(s) IV Push <User Schedule>    MEDICATIONS  (PRN):  acetaminophen   Tablet .. 650 milliGRAM(s) Oral every 6 hours PRN Temp greater or equal to 38C (100.4F), Mild Pain (1 - 3)  ketorolac   Injectable 15 milliGRAM(s) IV Push every 6 hours PRN Moderate Pain (4 - 6)  ketorolac   Injectable 30 milliGRAM(s) IV Push every 6 hours PRN Severe Pain (7 - 10)  ondansetron Injectable 4 milliGRAM(s) IV Push every 6 hours PRN Nausea    LABS:                          13.0   13.10 )-----------( 200      ( 06 Mar 2021 07:12 )             37.5     06 Mar 2021 07:12    142    |  111    |  6      ----------------------------<  84     3.9     |  24     |  0.61     Ca    8.6        06 Mar 2021 07:12    TPro  8.7    /  Alb  4.4    /  TBili  0.6    /  DBili  x      /  AST  12     /  ALT  18     /  AlkPhos  96     05 Mar 2021 16:41    LIVER FUNCTIONS - ( 05 Mar 2021 16:41 )  Alb: 4.4 g/dL / Pro: 8.7 gm/dL / ALK PHOS: 96 U/L / ALT: 18 U/L / AST: 12 U/L / GGT: x           PT/INR - ( 05 Mar 2021 16:41 )   PT: 13.0 sec;   INR: 1.12 ratio         PTT - ( 05 Mar 2021 16:41 )  PTT:30.2 sec  CAPILLARY BLOOD GLUCOSE      Urinalysis Basic - ( 05 Mar 2021 16:41 )    Color: Yellow / Appearance: very cloudy / S.020 / pH: x  Gluc: x / Ketone: Large  / Bili: Negative / Urobili: 1 mg/dL   Blood: x / Protein: 30 mg/dL / Nitrite: Positive   Leuk Esterase: Trace / RBC: >50 /HPF / WBC 3-5   Sq Epi: x / Non Sq Epi: Occasional / Bacteria: Occasional        RADIOLOGY:    < from: CT Abdomen and Pelvis w/ Oral Cont and w/ IV Cont (21 @ 19:14) >  LOWER CHEST: There is partially imaged atelectasis involving the lingula. There are opacities which are partially imaged at the right lung base with central lobular nodules compatible with pneumonia.    ABDOMEN:  LIVER: within normal limits.  BILE DUCTS: normal caliber.  GALLBLADDER: No calcified gallstones. Normal caliber wall.  PANCREAS: within normal limits.  SPLEEN: within normal limits.  ADRENALS: within normal limits.  KIDNEYS: Mild right hydroureteronephrosis secondary to a 3 mm stone in the proximal right ureter.    PELVIS:  REPRODUCTIVE ORGANS: no pelvic masses.  BLADDER: within normal limits.      BOWEL: No bowel obstruction or bowel wall thickening. Appendix is unremarkable. No enlarged mesenteric lymph nodes.  PERITONEUM: no ascites or free air, no fluid collection.  VESSELS: within normal limits.  RETROPERITONEUM: within normal limits.  ABDOMINAL WALL: within normal limits.  BONES: within normal limits.    IMPRESSION:  Mild right hydroureteronephrosis secondary to a 3 mm stone in the proximal right ureter.    Right lower lobe pneumonia is partially imaged. Partially imaged atelectasis right middle lobe. Further evaluation with dedicated chest radiograph or CT scan recommended.    < from: Xray Chest 2 Views PA/Lat (21 @ 21:24) >    CLINICAL INFORMATION: Chest Pain.    FINDINGS:    There is RIGHT middle lobe airspace consolidation obscuring RIGHT diaphragmatic contour. Remaining lung parenchyma clear. The airway is midline.  .  There is no pleural effusion or pneumothorax.  The heart size is within the limits of normal.  The visualized osseus structures are intact.    IMPRESSION:    RIGHT middle lobe airspace  consolidation/pneumonia.

## 2021-03-07 DIAGNOSIS — N13.2 HYDRONEPHROSIS WITH RENAL AND URETERAL CALCULOUS OBSTRUCTION: ICD-10-CM

## 2021-03-07 LAB
ANION GAP SERPL CALC-SCNC: 7 MMOL/L — SIGNIFICANT CHANGE UP (ref 5–17)
BASOPHILS # BLD AUTO: 0.06 K/UL — SIGNIFICANT CHANGE UP (ref 0–0.2)
BASOPHILS NFR BLD AUTO: 0.5 % — SIGNIFICANT CHANGE UP (ref 0–2)
BUN SERPL-MCNC: 6 MG/DL — LOW (ref 7–23)
CALCIUM SERPL-MCNC: 9.1 MG/DL — SIGNIFICANT CHANGE UP (ref 8.5–10.1)
CHLORIDE SERPL-SCNC: 111 MMOL/L — HIGH (ref 96–108)
CO2 SERPL-SCNC: 24 MMOL/L — SIGNIFICANT CHANGE UP (ref 22–31)
CREAT SERPL-MCNC: 0.65 MG/DL — SIGNIFICANT CHANGE UP (ref 0.5–1.3)
CULTURE RESULTS: NO GROWTH — SIGNIFICANT CHANGE UP
EOSINOPHIL # BLD AUTO: 1.02 K/UL — HIGH (ref 0–0.5)
EOSINOPHIL NFR BLD AUTO: 9.2 % — HIGH (ref 0–6)
GLUCOSE SERPL-MCNC: 87 MG/DL — SIGNIFICANT CHANGE UP (ref 70–99)
HCT VFR BLD CALC: 41.8 % — SIGNIFICANT CHANGE UP (ref 34.5–45)
HGB BLD-MCNC: 13.9 G/DL — SIGNIFICANT CHANGE UP (ref 11.5–15.5)
IMM GRANULOCYTES NFR BLD AUTO: 0.3 % — SIGNIFICANT CHANGE UP (ref 0–1.5)
LYMPHOCYTES # BLD AUTO: 29.2 % — SIGNIFICANT CHANGE UP (ref 13–44)
LYMPHOCYTES # BLD AUTO: 3.23 K/UL — SIGNIFICANT CHANGE UP (ref 1–3.3)
MCHC RBC-ENTMCNC: 31.6 PG — SIGNIFICANT CHANGE UP (ref 27–34)
MCHC RBC-ENTMCNC: 33.3 GM/DL — SIGNIFICANT CHANGE UP (ref 32–36)
MCV RBC AUTO: 95 FL — SIGNIFICANT CHANGE UP (ref 80–100)
MONOCYTES # BLD AUTO: 0.82 K/UL — SIGNIFICANT CHANGE UP (ref 0–0.9)
MONOCYTES NFR BLD AUTO: 7.4 % — SIGNIFICANT CHANGE UP (ref 2–14)
NEUTROPHILS # BLD AUTO: 5.92 K/UL — SIGNIFICANT CHANGE UP (ref 1.8–7.4)
NEUTROPHILS NFR BLD AUTO: 53.4 % — SIGNIFICANT CHANGE UP (ref 43–77)
PLATELET # BLD AUTO: 211 K/UL — SIGNIFICANT CHANGE UP (ref 150–400)
POTASSIUM SERPL-MCNC: 3.9 MMOL/L — SIGNIFICANT CHANGE UP (ref 3.5–5.3)
POTASSIUM SERPL-SCNC: 3.9 MMOL/L — SIGNIFICANT CHANGE UP (ref 3.5–5.3)
RBC # BLD: 4.4 M/UL — SIGNIFICANT CHANGE UP (ref 3.8–5.2)
RBC # FLD: 11.7 % — SIGNIFICANT CHANGE UP (ref 10.3–14.5)
SODIUM SERPL-SCNC: 142 MMOL/L — SIGNIFICANT CHANGE UP (ref 135–145)
SPECIMEN SOURCE: SIGNIFICANT CHANGE UP
WBC # BLD: 11.08 K/UL — HIGH (ref 3.8–10.5)
WBC # FLD AUTO: 11.08 K/UL — HIGH (ref 3.8–10.5)

## 2021-03-07 PROCEDURE — 99239 HOSP IP/OBS DSCHRG MGMT >30: CPT | Mod: GC

## 2021-03-07 PROCEDURE — 76770 US EXAM ABDO BACK WALL COMP: CPT | Mod: 26

## 2021-03-07 PROCEDURE — 99251: CPT

## 2021-03-07 RX ORDER — TAMSULOSIN HYDROCHLORIDE 0.4 MG/1
0.4 CAPSULE ORAL AT BEDTIME
Refills: 0 | Status: DISCONTINUED | OUTPATIENT
Start: 2021-03-07 | End: 2021-03-08

## 2021-03-07 RX ORDER — SODIUM CHLORIDE 9 MG/ML
1000 INJECTION INTRAMUSCULAR; INTRAVENOUS; SUBCUTANEOUS
Refills: 0 | Status: DISCONTINUED | OUTPATIENT
Start: 2021-03-07 | End: 2021-03-08

## 2021-03-07 RX ADMIN — SODIUM CHLORIDE 100 MILLILITER(S): 9 INJECTION INTRAMUSCULAR; INTRAVENOUS; SUBCUTANEOUS at 14:53

## 2021-03-07 RX ADMIN — TAMSULOSIN HYDROCHLORIDE 0.4 MILLIGRAM(S): 0.4 CAPSULE ORAL at 14:21

## 2021-03-07 NOTE — PROGRESS NOTE ADULT - PROBLEM SELECTOR PLAN 1
Passable stone, patient asx, will check KUB, if asx can go home. Reviewed options, prevention, precautions and followup

## 2021-03-07 NOTE — PROGRESS NOTE ADULT - ASSESSMENT
25 year old F w/ no significant PMH complaining of abdominal pain admitted for treatment of nephrolithiasis.     #Renal Colic 2/2 R kidney nephrolithiasis  -currently w/o pain  -CT abdomen (3/5): mild right hydronephrosis, 3mm stone in proximal right ureter  -US K&B (3/7): continued mild right hydronephrosis w/ 5mm stone  -S/p IVF 1L NS in ED  -Continue NS at 100cc/hr  -Continue Tylenol 650mg PRN mild pain/fever, Toradol 15mg q6 PRN Moderate pain, Toradol 30mg q6 PRN severe pain  -Strain urine - will need to send stone for analysis as this is patient's first episode of nephrolithiasis  -F/u urology consult   - continue flomax    #UTI  -U/A significant for Large blood, ketone, nitrates, bacteria and trace Leuk esterase; WBC 16  -No urinary symptoms reported   -UCx: negative  -d/c ceftriaxone    #RML Atelectasis/RLL PNA   -Noted on CT abdomen  -Likely atelectasis 2/2 poor inspiratory effort due to pain from nephrolithiasis  -CXR: R middle lobe airspace consolidation or PNA  -no clinical signs of PNA  -d/c azithromycin and ceftriaxone    #Code Status  -Full code     #DVT Prophylaxis  -Encourage ambulation; SCD    Above discussed with Dr. Marti

## 2021-03-07 NOTE — PROGRESS NOTE ADULT - SUBJECTIVE AND OBJECTIVE BOX
25F w/o significant PMHx presented with intermittent RLQ and flank pain with 3mm kidney stone w/ R mild hydronephrosis seen on CT admitted for acute kidney stone.     SUBJECTIVE: Pt feeling well. Denies any pain since yesterday AM.     REVIEW OF SYSTEMS:  CONSTITUTIONAL: No weakness, fevers or chills  EYES/ENT: No visual changes;  No vertigo or throat pain   NECK: No pain or stiffness  RESPIRATORY: No cough, wheezing, hemoptysis; No shortness of breath  CARDIOVASCULAR: No chest pain or palpitations  GASTROINTESTINAL: No abdominal or epigastric pain. No nausea, vomiting, or hematemesis; No diarrhea or constipation. No melena or hematochezia.  GENITOURINARY: No dysuria, frequency or hematuria  NEUROLOGICAL: No numbness or weakness  SKIN: No itching, burning, rashes, or lesions   All other review of systems is negative unless indicated above    Vital Signs Last 24 Hrs  T(C): 36.7 (07 Mar 2021 09:00), Max: 37.3 (06 Mar 2021 23:32)  T(F): 98 (07 Mar 2021 09:00), Max: 99.2 (06 Mar 2021 23:32)  HR: 90 (07 Mar 2021 09:00) (73 - 107)  BP: 126/76 (07 Mar 2021 09:00) (111/60 - 126/76)  BP(mean): 83 (06 Mar 2021 23:32) (83 - 83)  RR: 18 (07 Mar 2021 09:00) (18 - 18)  SpO2: 97% (07 Mar 2021 09:00) (96% - 98%)    I&O's Summary    06 Mar 2021 07:01  -  07 Mar 2021 07:00  --------------------------------------------------------  IN: 930 mL / OUT: 3350 mL / NET: -2420 mL    07 Mar 2021 07:01  -  07 Mar 2021 14:24  --------------------------------------------------------  IN: 0 mL / OUT: 1300 mL / NET: -1300 mL          PHYSICAL EXAM:  Constitutional: NAD, awake and alert, well-developed  HEENT: EOMI, Normal Hearing, MMM  Respiratory: Breath sounds are clear bilaterally, No wheezing, rales or rhonchi  Cardiovascular: S1 and S2, regular rate and rhythm, no Murmurs, gallops or rubs  Gastrointestinal: Bowel Sounds present, soft, nontender, nondistended, no guarding, no rebound  Extremities: No peripheral edema  Vascular: 2+ peripheral pulses  Neurological: A/O x 3, no focal deficits  Musculoskeletal: 5/5 strength b/l upper and lower extremities    MEDICATIONS:  MEDICATIONS  (STANDING):  tamsulosin 0.4 milliGRAM(s) Oral at bedtime      LABS: All Labs Reviewed:                        13.9   11.08 )-----------( 211      ( 07 Mar 2021 07:30 )             41.8     03-07    142  |  111<H>  |  6<L>  ----------------------------<  87  3.9   |  24  |  0.65    Ca    9.1      07 Mar 2021 07:30    TPro  8.7<H>  /  Alb  4.4  /  TBili  0.6  /  DBili  x   /  AST  12<L>  /  ALT  18  /  AlkPhos  96  03-05    PT/INR - ( 05 Mar 2021 16:41 )   PT: 13.0 sec;   INR: 1.12 ratio         PTT - ( 05 Mar 2021 16:41 )  PTT:30.2 sec    <from: US Kidney and Bladder (03.07.21 @ 12:57) >  FINDINGS:    Right kidney: 9.9 cm. There is mild right hydronephrosis and proximal hydroureter secondary to 5 mm calculus the proximal right ureter is noted on the prior CT.    Left kidney: 10.0 cm. No renal mass, hydronephrosis or calculi.    Urinary bladder: Within normal limits.    IMPRESSION:    Mild right hydronephrosis secondary to 5 mm proximal right ureter calculus. Findings are unchanged as compared with the prior CT.    < end of copied text >

## 2021-03-07 NOTE — PROGRESS NOTE ADULT - ASSESSMENT
Pt has been seen and examined with FP resident, resident supervised agree with a/p       Patient is a 25y old  Female who presents with a chief complaint of Abdominal pain (06 Mar 2021 11:32)      HPI:  Ms. Souza is a 25 year old F with no significant PMH presented to ED complaining of lower abdominal pain. Patient states that the pain began 2 days ago and at that time it was an intermittent cramping pain which she believed was indigestion The morning of admission, patient developed sharp tara-umbilical pain 7/10 pain that eventually radiated to her RLQ and R flank. Pain was associated with nausea, 4 episodes of non-bilious non-bloody emesis and decreased appetite. Pain was associated with chills the morning of admission, patient denies any fevers.  She denies dysuria, foul smelling urine or frequency prior to onset of pain.     In ED, Ms. Souza received Azithro 500mg IV x 1, Rocephin 1G x1 and NS 1L bolus.  (05 Mar 2021 23:40)      PHYSICAL EXAM:  Vital Signs Last 24 Hrs  T(C): 36.7 (07 Mar 2021 09:00), Max: 37.3 (06 Mar 2021 23:32)  T(F): 98 (07 Mar 2021 09:00), Max: 99.2 (06 Mar 2021 23:32)  HR: 90 (07 Mar 2021 09:00) (73 - 107)  BP: 126/76 (07 Mar 2021 09:00) (111/60 - 126/76)  BP(mean): 83 (06 Mar 2021 23:32) (83 - 83)  RR: 18 (07 Mar 2021 09:00) (18 - 18)  SpO2: 97% (07 Mar 2021 09:00) (96% - 98%)  -rs-aeeb, cta  -cvs-s1s2 normal   -p/a-soft,bs+      A/P    #no more pain, appears that she has passed stone     #have urology evaluation if for any reason urology does not see pt in timely fashion then d/c home today with further management as an outpt, time spent 45 minutes    Pt has been seen and examined with FP resident, resident supervised agree with a/p       Patient is a 25y old  Female who presents with a chief complaint of Abdominal pain (06 Mar 2021 11:32)      HPI:  Ms. Souza is a 25 year old F with no significant PMH presented to ED complaining of lower abdominal pain. Patient states that the pain began 2 days ago and at that time it was an intermittent cramping pain which she believed was indigestion The morning of admission, patient developed sharp tara-umbilical pain 7/10 pain that eventually radiated to her RLQ and R flank. Pain was associated with nausea, 4 episodes of non-bilious non-bloody emesis and decreased appetite. Pain was associated with chills the morning of admission, patient denies any fevers.  She denies dysuria, foul smelling urine or frequency prior to onset of pain.     In ED, Ms. Souza received Azithro 500mg IV x 1, Rocephin 1G x1 and NS 1L bolus.  (05 Mar 2021 23:40)      PHYSICAL EXAM:  Vital Signs Last 24 Hrs  T(C): 36.7 (07 Mar 2021 09:00), Max: 37.3 (06 Mar 2021 23:32)  T(F): 98 (07 Mar 2021 09:00), Max: 99.2 (06 Mar 2021 23:32)  HR: 90 (07 Mar 2021 09:00) (73 - 107)  BP: 126/76 (07 Mar 2021 09:00) (111/60 - 126/76)  BP(mean): 83 (06 Mar 2021 23:32) (83 - 83)  RR: 18 (07 Mar 2021 09:00) (18 - 18)  SpO2: 97% (07 Mar 2021 09:00) (96% - 98%)  -rs-aeeb, cta  -cvs-s1s2 normal   -p/a-soft,bs+      A/P    #no more pain, appears that she has passed stone     #have urology evaluation if for any reason urology does not see pt in timely fashion then d/c home today with further management as an outpt, time spent 45 minutes     #clinically no pneumonia- its atelectasis due to pain due to renal stone on same side     #d/c abx     #Leucocytosis -due to pain and stress

## 2021-03-07 NOTE — PROGRESS NOTE ADULT - SUBJECTIVE AND OBJECTIVE BOX
CHIEF COMPLAINT:Right flan pain    HISTORY OF PRESENT ILLNESS:3mm ureteral stone/asx    PAST MEDICAL & SURGICAL HISTORY:  S/P tonsillectomy        REVIEW OF SYSTEMS:    CONSTITUTIONAL: No weakness, fevers or chills  EYES/ENT: No visual changes;  No vertigo or throat pain   NECK: No pain or stiffness  RESPIRATORY: No cough, wheezing, hemoptysis; No shortness of breath  CARDIOVASCULAR: No chest pain or palpitations  GASTROINTESTINAL: No abdominal or epigastric pain. No nausea, vomiting, or hematemesis; No diarrhea or constipation. No melena or hematochezia.  GENITOURINARY: No dysuria, frequency or hematuria  NEUROLOGICAL: No numbness or weakness  SKIN: No itching, burning, rashes, or lesions   All other review of systems is negative unless indicated above.    MEDICATIONS  (STANDING):  sodium chloride 0.9%. 1000 milliLiter(s) (100 mL/Hr) IV Continuous <Continuous>  tamsulosin 0.4 milliGRAM(s) Oral at bedtime    MEDICATIONS  (PRN):  acetaminophen   Tablet .. 650 milliGRAM(s) Oral every 6 hours PRN Temp greater or equal to 38C (100.4F), Mild Pain (1 - 3)  ketorolac   Injectable 15 milliGRAM(s) IV Push every 6 hours PRN Moderate Pain (4 - 6)  ketorolac   Injectable 30 milliGRAM(s) IV Push every 6 hours PRN Severe Pain (7 - 10)  ondansetron Injectable 4 milliGRAM(s) IV Push every 6 hours PRN Nausea      Allergies    codeine (Other)  Nuts (Unknown)  penicillins (Other)  shellfish (Other (Severe))    Intolerances        SOCIAL HISTORY:    FAMILY HISTORY:  FH: hyperlipidemia  father    FH: HTN (hypertension)  father        Vital Signs Last 24 Hrs  T(C): 37 (07 Mar 2021 15:33), Max: 37.3 (06 Mar 2021 23:32)  T(F): 98.6 (07 Mar 2021 15:33), Max: 99.2 (06 Mar 2021 23:32)  HR: 92 (07 Mar 2021 15:33) (73 - 107)  BP: 118/77 (07 Mar 2021 15:33) (112/60 - 126/76)  BP(mean): 83 (06 Mar 2021 23:32) (83 - 83)  RR: 18 (07 Mar 2021 15:33) (18 - 18)  SpO2: 100% (07 Mar 2021 15:33) (97% - 100%)    PHYSICAL EXAM:    Constitutional: NAD, well-developed  HEENT: MERLE, EOMI, Normal Hearing, MMM  Neck: No LAD, No JVD  Back: Normal spine flexure, No CVA tenderness  Respiratory: CTAB   Cardiovascular: S1 and S2, RRR, no M/G/R  Abd: BS+, soft, NT/ND, No CVAT  Extremities: No peripheral edema  Vascular: 2+ peripheral pulses  Neurological: A/O x 3, no focal deficits  Psychiatric: Normal mood, normal affect  Musculoskeletal: 5/5 strength b/l upper and lower extremities  Skin: No rashes    LABS:                        13.9   11.08 )-----------( 211      ( 07 Mar 2021 07:30 )             41.8     03-07    142  |  111<H>  |  6<L>  ----------------------------<  87  3.9   |  24  |  0.65    Ca    9.1      07 Mar 2021 07:30          Urine Culture:     RADIOLOGY & ADDITIONAL STUDIES:

## 2021-03-08 ENCOUNTER — TRANSCRIPTION ENCOUNTER (OUTPATIENT)
Age: 26
End: 2021-03-08

## 2021-03-08 VITALS
SYSTOLIC BLOOD PRESSURE: 125 MMHG | DIASTOLIC BLOOD PRESSURE: 68 MMHG | RESPIRATION RATE: 18 BRPM | OXYGEN SATURATION: 100 % | TEMPERATURE: 98 F | HEART RATE: 93 BPM

## 2021-03-08 LAB
ANION GAP SERPL CALC-SCNC: 4 MMOL/L — LOW (ref 5–17)
BUN SERPL-MCNC: 4 MG/DL — LOW (ref 7–23)
CALCIUM SERPL-MCNC: 8.8 MG/DL — SIGNIFICANT CHANGE UP (ref 8.5–10.1)
CHLORIDE SERPL-SCNC: 112 MMOL/L — HIGH (ref 96–108)
CO2 SERPL-SCNC: 25 MMOL/L — SIGNIFICANT CHANGE UP (ref 22–31)
CREAT SERPL-MCNC: 0.67 MG/DL — SIGNIFICANT CHANGE UP (ref 0.5–1.3)
GLUCOSE SERPL-MCNC: 95 MG/DL — SIGNIFICANT CHANGE UP (ref 70–99)
POTASSIUM SERPL-MCNC: 4.2 MMOL/L — SIGNIFICANT CHANGE UP (ref 3.5–5.3)
POTASSIUM SERPL-SCNC: 4.2 MMOL/L — SIGNIFICANT CHANGE UP (ref 3.5–5.3)
SODIUM SERPL-SCNC: 141 MMOL/L — SIGNIFICANT CHANGE UP (ref 135–145)

## 2021-03-08 PROCEDURE — 74018 RADEX ABDOMEN 1 VIEW: CPT | Mod: 26

## 2021-03-08 PROCEDURE — 99231 SBSQ HOSP IP/OBS SF/LOW 25: CPT

## 2021-03-08 RX ORDER — TAMSULOSIN HYDROCHLORIDE 0.4 MG/1
1 CAPSULE ORAL
Qty: 5 | Refills: 0
Start: 2021-03-08 | End: 2021-03-12

## 2021-03-08 RX ORDER — NITROFURANTOIN MACROCRYSTAL 50 MG
1 CAPSULE ORAL
Qty: 10 | Refills: 0
Start: 2021-03-08 | End: 2021-03-12

## 2021-03-08 RX ORDER — TAMSULOSIN HYDROCHLORIDE 0.4 MG/1
1 CAPSULE ORAL
Qty: 0 | Refills: 0 | DISCHARGE
Start: 2021-03-08

## 2021-03-08 RX ORDER — NITROFURANTOIN MACROCRYSTAL 50 MG
1 CAPSULE ORAL
Qty: 5 | Refills: 0
Start: 2021-03-08 | End: 2021-03-12

## 2021-03-08 RX ADMIN — SODIUM CHLORIDE 100 MILLILITER(S): 9 INJECTION INTRAMUSCULAR; INTRAVENOUS; SUBCUTANEOUS at 00:53

## 2021-03-08 NOTE — DISCHARGE NOTE PROVIDER - CARE PROVIDER_API CALL
Servando Luna)  Urology  [address] [facility], [address] [place] [address]  Phone: [phone]  Fax: [phone]  Follow Up Time: 1 week

## 2021-03-08 NOTE — PROGRESS NOTE ADULT - SUBJECTIVE AND OBJECTIVE BOX
Patient seen and examined at bedside. Patient reports she remains asymptomatic and feels a lot better. She denies any abd/flank pain, fevers, chills, n/v.     PE  General: No distress, No anxiety  VITALS  T(C): 36.9 (03-08-21 @ 08:38), Max: 37 (03-07-21 @ 15:33)  HR: 81 (03-08-21 @ 08:38) (81 - 101)  BP: 116/73 (03-08-21 @ 08:38) (116/73 - 118/77)  RR: 16 (03-08-21 @ 08:38) (16 - 18)  SpO2: 98% (03-08-21 @ 08:38) (98% - 100%)            Skin     : No jaundice, No lesions, No swelling  HEENT: No icterus , EOM full , No epistaxis  Abdo:   : Soft, Non tender, No guarding, No distension  Back    : No CVAT b/l  Extremity: No calf tenderness   Genitalia Female: No King  Neuro   : A&Ox3      LABS                        13.9   11.08 )-----------( 211      ( 07 Mar 2021 07:30 )             41.8     03-08    141  |  112<H>  |  4<L>  ----------------------------<  95  4.2   |  25  |  0.67    Ca    8.8      08 Mar 2021 07:28

## 2021-03-08 NOTE — DISCHARGE NOTE PROVIDER - HOSPITAL COURSE
Pt is a 25 year old F with no significant PMH who presented to ED complaining of lower abdominal pain. Patient stated that the pain began 2 days ago prior to admission and at that time it was an intermittent cramping pain which she believed was indigestion. The morning of admission, patient developed sharp tara-umbilical pain 7/10 pain that eventually radiated to her RLQ and R flank. Pain was associated with nausea, 4 episodes of non-bilious non-bloody emesis and decreased appetite. Pain was associated with chills the morning of admission, patient denied any fevers.  She denied dysuria, foul smelling urine or frequency prior to onset of pain.   CT of the abdomen and pelvis performed in the ED revealed mild right hydroureteronephrosis secondary to a 3mm stone in the proximal right ureter. It also showed partially imaged atelectasis in the right middle lobe versus a possible consolidation. For this reason, patient was started on Rocephin and azithromycin in the ED, which was discontinued during the course of her hospitalization as patient clinically did not appear to have PNA. Rather she likely had atelectasis secondary to poor inspiratory effort due to pain from nephrolithiasis. Patient was evaluated by urology during this visit. She was started on Flomax, told to strain her urine, given IVFs, and given appropriate pain control. On the day of discharge, patient was asymptomatic and pain was well controlled. KUB X-ray done on day of discharge revealed ___. Patient will follow-up outpatient with Dr. Luna for further stone management. She will be discharged home with Flomax and Macrobid for 5 days. She is hemodynamically stable and medically clear for discharge.     Today is hospital day 3. Patient reports feeling a lot better. She was able to urinate without any difficulty and would like to go home.     Vital Signs Last 24 Hrs  T(C): 36.9 (08 Mar 2021 08:38), Max: 37 (07 Mar 2021 15:33)  T(F): 98.5 (08 Mar 2021 08:38), Max: 98.6 (07 Mar 2021 15:33)  HR: 81 (08 Mar 2021 08:38) (81 - 101)  BP: 116/73 (08 Mar 2021 08:38) (116/73 - 118/77)  BP(mean): 81 (07 Mar 2021 23:05) (81 - 81)  RR: 16 (08 Mar 2021 08:38) (16 - 18)  SpO2: 98% (08 Mar 2021 08:38) (98% - 100%)    PHYSICAL EXAM:  Constitutional: NAD, awake and alert, well-developed   HEENT: EOMI, Normal Hearing  Neck: Soft and supple  Respiratory: Breath sounds are clear bilaterally, No wheezing, rales or rhonchi  Cardiovascular: S1 and S2, regular rate and rhythm, no Murmurs, gallops or rubs  Gastrointestinal: Bowel Sounds present, soft, nontender, nondistended, no guarding, no rebound  Extremities: No peripheral edema  Vascular: 2+ peripheral pulses  Neurological: A/O x 3, no focal deficits    LABS:                        13.9   11.08 )-----------( 211      ( 07 Mar 2021 07:30 )             41.8     03-08    141  |  112<H>  |  4<L>  ----------------------------<  95  4.2   |  25  |  0.67    Ca    8.8      08 Mar 2021 07:28    IMAGING:  EXAM:  CT ABDOMEN AND PELVIS OC IC                        PROCEDURE DATE:  03/05/2021      IMPRESSION:  Mild right hydroureteronephrosis secondary to a 3 mm stone in the proximal right ureter.  Right lower lobe pneumonia is partially imaged. Partially imaged atelectasis right middle lobe. Further evaluation with dedicated chest radiograph or CT scan recommended.                       Pt is a 25 year old F with no significant PMH who presented to ED complaining of lower abdominal pain. Patient stated that the pain began 2 days ago prior to admission and at that time it was an intermittent cramping pain which she believed was indigestion. The morning of admission, patient developed sharp tara-umbilical pain 7/10 pain that eventually radiated to her RLQ and R flank. Pain was associated with nausea, 4 episodes of non-bilious non-bloody emesis and decreased appetite. Pain was associated with chills the morning of admission, patient denied any fevers.  She denied dysuria, foul smelling urine or frequency prior to onset of pain.   CT of the abdomen and pelvis performed in the ED revealed mild right hydroureteronephrosis secondary to a 3mm stone in the proximal right ureter. It also showed partially imaged atelectasis in the right middle lobe versus a possible consolidation. For this reason, patient was started on Rocephin and azithromycin in the ED, which was discontinued during the course of her hospitalization as patient clinically did not appear to have PNA. Rather she likely had atelectasis secondary to poor inspiratory effort due to pain from nephrolithiasis. Patient was evaluated by urology during this visit. She was started on Flomax, told to strain her urine, given IVFs, and given appropriate pain control. On the day of discharge, patient was asymptomatic and pain was well controlled. KUB X-ray done on day of discharge, personal review did not reveal a stone, but official read pending. Patient will follow-up outpatient with Dr. Luna for further stone management. She will be discharged home with Flomax and Macrobid for 5 days. She is hemodynamically stable and medically clear for discharge.     Today is hospital day 3. Patient reports feeling a lot better. She was able to urinate without any difficulty and would like to go home.     Vital Signs Last 24 Hrs  T(C): 36.9 (08 Mar 2021 08:38), Max: 37 (07 Mar 2021 15:33)  T(F): 98.5 (08 Mar 2021 08:38), Max: 98.6 (07 Mar 2021 15:33)  HR: 81 (08 Mar 2021 08:38) (81 - 101)  BP: 116/73 (08 Mar 2021 08:38) (116/73 - 118/77)  BP(mean): 81 (07 Mar 2021 23:05) (81 - 81)  RR: 16 (08 Mar 2021 08:38) (16 - 18)  SpO2: 98% (08 Mar 2021 08:38) (98% - 100%)    PHYSICAL EXAM:  Constitutional: NAD, awake and alert, well-developed   HEENT: EOMI, Normal Hearing  Neck: Soft and supple  Respiratory: Breath sounds are clear bilaterally, No wheezing, rales or rhonchi  Cardiovascular: S1 and S2, regular rate and rhythm, no Murmurs, gallops or rubs  Gastrointestinal: Bowel Sounds present, soft, nontender, nondistended, no guarding, no rebound  Extremities: No peripheral edema  Vascular: 2+ peripheral pulses  Neurological: A/O x 3, no focal deficits    LABS:                        13.9   11.08 )-----------( 211      ( 07 Mar 2021 07:30 )             41.8     03-08    141  |  112<H>  |  4<L>  ----------------------------<  95  4.2   |  25  |  0.67    Ca    8.8      08 Mar 2021 07:28    IMAGING:  EXAM:  CT ABDOMEN AND PELVIS OC IC                        PROCEDURE DATE:  03/05/2021      IMPRESSION:  Mild right hydroureteronephrosis secondary to a 3 mm stone in the proximal right ureter.  Right lower lobe pneumonia is partially imaged. Partially imaged atelectasis right middle lobe. Further evaluation with dedicated chest radiograph or CT scan recommended.                       Pt is a 25 year old F with no significant PMH who presented to ED complaining of lower abdominal pain. Patient stated that the pain began 2 days ago prior to admission and at that time it was an intermittent cramping pain which she believed was indigestion. The morning of admission, patient developed sharp tara-umbilical pain 7/10 pain that eventually radiated to her RLQ and R flank. Pain was associated with nausea, 4 episodes of non-bilious non-bloody emesis and decreased appetite. Pain was associated with chills the morning of admission, patient denied any fevers.  She denied dysuria, foul smelling urine or frequency prior to onset of pain.   CT of the abdomen and pelvis performed in the ED revealed mild right hydroureteronephrosis secondary to a 3mm stone in the proximal right ureter. It also showed partially imaged atelectasis in the right middle lobe versus a possible consolidation. For this reason, patient was started on Rocephin and azithromycin in the ED, which was discontinued during the course of her hospitalization as patient clinically did not appear to have PNA. Rather she likely had atelectasis secondary to poor inspiratory effort due to pain from nephrolithiasis. Patient was evaluated by urology during this visit. She was started on Flomax, told to strain her urine, given IVFs, and given appropriate pain control for the ureteral stone. UTI was ruled out. On the day of discharge, patient was asymptomatic and pain was well controlled. KUB X-ray done on day of discharge, personal review did not reveal a stone, but official read pending. Patient will follow-up outpatient with Dr. Luna for further stone management. She will be discharged home with Flomax and Macrobid for 5 days. She is hemodynamically stable and medically clear for discharge.     Today is hospital day 3. Patient reports feeling a lot better. She was able to urinate without any difficulty and would like to go home.     Vital Signs Last 24 Hrs  T(C): 36.9 (08 Mar 2021 08:38), Max: 37 (07 Mar 2021 15:33)  T(F): 98.5 (08 Mar 2021 08:38), Max: 98.6 (07 Mar 2021 15:33)  HR: 81 (08 Mar 2021 08:38) (81 - 101)  BP: 116/73 (08 Mar 2021 08:38) (116/73 - 118/77)  BP(mean): 81 (07 Mar 2021 23:05) (81 - 81)  RR: 16 (08 Mar 2021 08:38) (16 - 18)  SpO2: 98% (08 Mar 2021 08:38) (98% - 100%)    PHYSICAL EXAM:  Constitutional: NAD, awake and alert, well-developed   HEENT: EOMI, Normal Hearing  Neck: Soft and supple  Respiratory: Breath sounds are clear bilaterally, No wheezing, rales or rhonchi  Cardiovascular: S1 and S2, regular rate and rhythm, no Murmurs, gallops or rubs  Gastrointestinal: Bowel Sounds present, soft, nontender, nondistended, no guarding, no rebound  Extremities: No peripheral edema  Vascular: 2+ peripheral pulses  Neurological: A/O x 3, no focal deficits    LABS:                        13.9   11.08 )-----------( 211      ( 07 Mar 2021 07:30 )             41.8     03-08    141  |  112<H>  |  4<L>  ----------------------------<  95  4.2   |  25  |  0.67    Ca    8.8      08 Mar 2021 07:28    IMAGING:  EXAM:  CT ABDOMEN AND PELVIS OC IC                        PROCEDURE DATE:  03/05/2021      IMPRESSION:  Mild right hydroureteronephrosis secondary to a 3 mm stone in the proximal right ureter.  Right lower lobe pneumonia is partially imaged. Partially imaged atelectasis right middle lobe. Further evaluation with dedicated chest radiograph or CT scan recommended.                       Pt is a 25 year old F with no significant PMH who presented to ED complaining of lower abdominal pain. Patient stated that the pain began 2 days ago prior to admission and at that time it was an intermittent cramping pain which she believed was indigestion. The morning of admission, patient developed sharp tara-umbilical pain 7/10 pain that eventually radiated to her RLQ and R flank. Pain was associated with nausea, 4 episodes of non-bilious non-bloody emesis and decreased appetite. Pain was associated with chills the morning of admission, patient denied any fevers.  She denied dysuria, foul smelling urine or frequency prior to onset of pain.   CT of the abdomen and pelvis performed in the ED revealed mild right hydroureteronephrosis secondary to a 3mm stone in the proximal right ureter. It also showed partially imaged atelectasis in the right middle lobe versus a possible consolidation. For this reason, patient was started on Rocephin and azithromycin in the ED, which was discontinued during the course of her hospitalization as patient clinically did not appear to have PNA. Rather she likely had atelectasis secondary to poor inspiratory effort due to pain from nephrolithiasis. Patient was evaluated by urology during this visit. She was started on Flomax, told to strain her urine, given IVFs, and given appropriate pain control for the ureteral stone. Patient was treated for UTI which resolved by the time of discharge. She did not have lactic acidosis. On the day of discharge, patient was asymptomatic and pain was well controlled. KUB X-ray done on day of discharge, personal review did not reveal a stone, but official read pending. Patient will follow-up outpatient with Dr. Luna for further stone management. She will be discharged home with Flomax and Macrobid for 5 days. She is hemodynamically stable and medically clear for discharge.     Today is hospital day 3. Patient reports feeling a lot better. She was able to urinate without any difficulty and would like to go home.     Vital Signs Last 24 Hrs  T(C): 36.9 (08 Mar 2021 08:38), Max: 37 (07 Mar 2021 15:33)  T(F): 98.5 (08 Mar 2021 08:38), Max: 98.6 (07 Mar 2021 15:33)  HR: 81 (08 Mar 2021 08:38) (81 - 101)  BP: 116/73 (08 Mar 2021 08:38) (116/73 - 118/77)  BP(mean): 81 (07 Mar 2021 23:05) (81 - 81)  RR: 16 (08 Mar 2021 08:38) (16 - 18)  SpO2: 98% (08 Mar 2021 08:38) (98% - 100%)    PHYSICAL EXAM:  Constitutional: NAD, awake and alert, well-developed   HEENT: EOMI, Normal Hearing  Neck: Soft and supple  Respiratory: Breath sounds are clear bilaterally, No wheezing, rales or rhonchi  Cardiovascular: S1 and S2, regular rate and rhythm, no Murmurs, gallops or rubs  Gastrointestinal: Bowel Sounds present, soft, nontender, nondistended, no guarding, no rebound  Extremities: No peripheral edema  Vascular: 2+ peripheral pulses  Neurological: A/O x 3, no focal deficits    LABS:                        13.9   11.08 )-----------( 211      ( 07 Mar 2021 07:30 )             41.8     03-08    141  |  112<H>  |  4<L>  ----------------------------<  95  4.2   |  25  |  0.67    Ca    8.8      08 Mar 2021 07:28    IMAGING:  EXAM:  CT ABDOMEN AND PELVIS OC IC                        PROCEDURE DATE:  03/05/2021      IMPRESSION:  Mild right hydroureteronephrosis secondary to a 3 mm stone in the proximal right ureter.  Right lower lobe pneumonia is partially imaged. Partially imaged atelectasis right middle lobe. Further evaluation with dedicated chest radiograph or CT scan recommended.

## 2021-03-08 NOTE — DISCHARGE NOTE PROVIDER - NSDCMRMEDTOKEN_GEN_ALL_CORE_FT
Flomax 0.4 mg oral capsule: 1 cap(s) orally once a day (at bedtime)   Macrobid 100 mg oral capsule: 1 cap(s) orally once a day   tamsulosin 0.4 mg oral capsule: 1 cap(s) orally once a day (at bedtime)   Flomax 0.4 mg oral capsule: 1 cap(s) orally once a day (at bedtime)   Macrobid 100 mg oral capsule: 1 cap(s) orally 2 times a day   tamsulosin 0.4 mg oral capsule: 1 cap(s) orally once a day (at bedtime)

## 2021-03-08 NOTE — DISCHARGE NOTE NURSING/CASE MANAGEMENT/SOCIAL WORK - PATIENT PORTAL LINK FT
You can access the FollowMyHealth Patient Portal offered by [facility] by registering at the following website: [email or web address]. By joining [facility]’s FollowMyHealth portal, you will also be able to view your health information using other applications (apps) compatible with our system.

## 2021-03-08 NOTE — PROGRESS NOTE ADULT - ASSESSMENT
24 yo female with 3mm right ureteral stone causing mild HN on CT. Patient currently asymptomatic. Discussed cystoscopy, ureteral stent placement with patient, risks, benefits and alternatives which include nephrostomy tube or conservative observation/ medical expulsive therapy. Patient would like to like to proceed with conservative management as she is asymptomatic and would like to be discharged home with medications to help pass the stone.  Recommend  - Strain urine  - D/C home with Flomax and Macrobid x 5 days  - Pain control/ hydration  - F/U cultures and sensitivities and treat accordingly  - Patient will follow up outpatient for further stone management  - Educated and discussed with pt if she develops fevers, chills or worsening pain then return to the hospital.     Case discussed with Dr. Luna

## 2021-03-08 NOTE — DISCHARGE NOTE PROVIDER - NSDCFUSCHEDAPPT_GEN_ALL_CORE_FT
PATRICK COLBY ; 03/16/2021 ; NPP Urology [address] [place] [address] PATRICK COLBY ; 03/17/2021 ; NPP Rad  [address]

## 2021-03-10 ENCOUNTER — EMERGENCY (EMERGENCY)
Facility: HOSPITAL | Age: 26
LOS: 0 days | Discharge: HOME | End: 2021-03-10
Attending: EMERGENCY MEDICINE
Payer: COMMERCIAL

## 2021-03-10 VITALS
WEIGHT: 110.01 LBS | HEIGHT: 63 IN | TEMPERATURE: 98 F | DIASTOLIC BLOOD PRESSURE: 78 MMHG | OXYGEN SATURATION: 95 % | HEART RATE: 129 BPM | RESPIRATION RATE: 18 BRPM | SYSTOLIC BLOOD PRESSURE: 137 MMHG

## 2021-03-10 VITALS
HEART RATE: 91 BPM | TEMPERATURE: 99 F | RESPIRATION RATE: 18 BRPM | DIASTOLIC BLOOD PRESSURE: 86 MMHG | OXYGEN SATURATION: 97 % | SYSTOLIC BLOOD PRESSURE: 137 MMHG

## 2021-03-10 DIAGNOSIS — Z90.89 ACQUIRED ABSENCE OF OTHER ORGANS: Chronic | ICD-10-CM

## 2021-03-10 DIAGNOSIS — Z91.018 ALLERGY TO OTHER FOODS: ICD-10-CM

## 2021-03-10 DIAGNOSIS — Z91.013 ALLERGY TO SEAFOOD: ICD-10-CM

## 2021-03-10 DIAGNOSIS — Z88.5 ALLERGY STATUS TO NARCOTIC AGENT: ICD-10-CM

## 2021-03-10 DIAGNOSIS — Z88.0 ALLERGY STATUS TO PENICILLIN: ICD-10-CM

## 2021-03-10 DIAGNOSIS — Z87.442 PERSONAL HISTORY OF URINARY CALCULI: ICD-10-CM

## 2021-03-10 DIAGNOSIS — R05 COUGH: ICD-10-CM

## 2021-03-10 DIAGNOSIS — Z83.49 FAMILY HISTORY OF OTHER ENDOCRINE, NUTRITIONAL AND METABOLIC DISEASES: ICD-10-CM

## 2021-03-10 DIAGNOSIS — R06.02 SHORTNESS OF BREATH: ICD-10-CM

## 2021-03-10 DIAGNOSIS — Z82.49 FAMILY HISTORY OF ISCHEMIC HEART DISEASE AND OTHER DISEASES OF THE CIRCULATORY SYSTEM: ICD-10-CM

## 2021-03-10 DIAGNOSIS — J18.9 PNEUMONIA, UNSPECIFIED ORGANISM: ICD-10-CM

## 2021-03-10 PROBLEM — Z78.9 OTHER SPECIFIED HEALTH STATUS: Chronic | Status: ACTIVE | Noted: 2021-03-08

## 2021-03-10 LAB
ANION GAP SERPL CALC-SCNC: 5 MMOL/L — SIGNIFICANT CHANGE UP (ref 5–17)
APTT BLD: 32.4 SEC — SIGNIFICANT CHANGE UP (ref 27.5–35.5)
BASOPHILS # BLD AUTO: 0.07 K/UL — SIGNIFICANT CHANGE UP (ref 0–0.2)
BASOPHILS NFR BLD AUTO: 0.5 % — SIGNIFICANT CHANGE UP (ref 0–2)
BUN SERPL-MCNC: 8 MG/DL — SIGNIFICANT CHANGE UP (ref 7–23)
CALCIUM SERPL-MCNC: 9.3 MG/DL — SIGNIFICANT CHANGE UP (ref 8.5–10.1)
CHLORIDE SERPL-SCNC: 108 MMOL/L — SIGNIFICANT CHANGE UP (ref 96–108)
CO2 SERPL-SCNC: 27 MMOL/L — SIGNIFICANT CHANGE UP (ref 22–31)
CREAT SERPL-MCNC: 0.76 MG/DL — SIGNIFICANT CHANGE UP (ref 0.5–1.3)
EOSINOPHIL # BLD AUTO: 1.24 K/UL — HIGH (ref 0–0.5)
EOSINOPHIL NFR BLD AUTO: 9.2 % — HIGH (ref 0–6)
GLUCOSE SERPL-MCNC: 102 MG/DL — HIGH (ref 70–99)
HCT VFR BLD CALC: 43.5 % — SIGNIFICANT CHANGE UP (ref 34.5–45)
HGB BLD-MCNC: 15 G/DL — SIGNIFICANT CHANGE UP (ref 11.5–15.5)
IMM GRANULOCYTES NFR BLD AUTO: 0.3 % — SIGNIFICANT CHANGE UP (ref 0–1.5)
INR BLD: 1.13 RATIO — SIGNIFICANT CHANGE UP (ref 0.88–1.16)
LYMPHOCYTES # BLD AUTO: 23 % — SIGNIFICANT CHANGE UP (ref 13–44)
LYMPHOCYTES # BLD AUTO: 3.1 K/UL — SIGNIFICANT CHANGE UP (ref 1–3.3)
MCHC RBC-ENTMCNC: 31.7 PG — SIGNIFICANT CHANGE UP (ref 27–34)
MCHC RBC-ENTMCNC: 34.5 GM/DL — SIGNIFICANT CHANGE UP (ref 32–36)
MCV RBC AUTO: 92 FL — SIGNIFICANT CHANGE UP (ref 80–100)
MONOCYTES # BLD AUTO: 0.82 K/UL — SIGNIFICANT CHANGE UP (ref 0–0.9)
MONOCYTES NFR BLD AUTO: 6.1 % — SIGNIFICANT CHANGE UP (ref 2–14)
NEUTROPHILS # BLD AUTO: 8.22 K/UL — HIGH (ref 1.8–7.4)
NEUTROPHILS NFR BLD AUTO: 60.9 % — SIGNIFICANT CHANGE UP (ref 43–77)
PLATELET # BLD AUTO: 250 K/UL — SIGNIFICANT CHANGE UP (ref 150–400)
POTASSIUM SERPL-MCNC: 3.8 MMOL/L — SIGNIFICANT CHANGE UP (ref 3.5–5.3)
POTASSIUM SERPL-SCNC: 3.8 MMOL/L — SIGNIFICANT CHANGE UP (ref 3.5–5.3)
PROTHROM AB SERPL-ACNC: 13 SEC — SIGNIFICANT CHANGE UP (ref 10.6–13.6)
RBC # BLD: 4.73 M/UL — SIGNIFICANT CHANGE UP (ref 3.8–5.2)
RBC # FLD: 11.5 % — SIGNIFICANT CHANGE UP (ref 10.3–14.5)
SARS-COV-2 RNA SPEC QL NAA+PROBE: SIGNIFICANT CHANGE UP
SODIUM SERPL-SCNC: 140 MMOL/L — SIGNIFICANT CHANGE UP (ref 135–145)
WBC # BLD: 13.49 K/UL — HIGH (ref 3.8–10.5)
WBC # FLD AUTO: 13.49 K/UL — HIGH (ref 3.8–10.5)

## 2021-03-10 PROCEDURE — 81025 URINE PREGNANCY TEST: CPT

## 2021-03-10 PROCEDURE — 85025 COMPLETE CBC W/AUTO DIFF WBC: CPT

## 2021-03-10 PROCEDURE — 71275 CT ANGIOGRAPHY CHEST: CPT

## 2021-03-10 PROCEDURE — 93010 ELECTROCARDIOGRAM REPORT: CPT

## 2021-03-10 PROCEDURE — 36415 COLL VENOUS BLD VENIPUNCTURE: CPT

## 2021-03-10 PROCEDURE — 99284 EMERGENCY DEPT VISIT MOD MDM: CPT

## 2021-03-10 PROCEDURE — 71045 X-RAY EXAM CHEST 1 VIEW: CPT | Mod: 26

## 2021-03-10 PROCEDURE — 71045 X-RAY EXAM CHEST 1 VIEW: CPT

## 2021-03-10 PROCEDURE — 80048 BASIC METABOLIC PNL TOTAL CA: CPT

## 2021-03-10 PROCEDURE — 85379 FIBRIN DEGRADATION QUANT: CPT

## 2021-03-10 PROCEDURE — U0003: CPT

## 2021-03-10 PROCEDURE — 85610 PROTHROMBIN TIME: CPT

## 2021-03-10 PROCEDURE — U0005: CPT

## 2021-03-10 PROCEDURE — 99284 EMERGENCY DEPT VISIT MOD MDM: CPT | Mod: 25

## 2021-03-10 PROCEDURE — 93005 ELECTROCARDIOGRAM TRACING: CPT

## 2021-03-10 PROCEDURE — 71275 CT ANGIOGRAPHY CHEST: CPT | Mod: 26

## 2021-03-10 PROCEDURE — 85730 THROMBOPLASTIN TIME PARTIAL: CPT

## 2021-03-10 RX ORDER — AZITHROMYCIN 500 MG/1
250 TABLET, FILM COATED ORAL
Qty: 1000 | Refills: 0
Start: 2021-03-10 | End: 2021-03-13

## 2021-03-10 RX ORDER — CEFUROXIME AXETIL 250 MG
1 TABLET ORAL
Qty: 14 | Refills: 0
Start: 2021-03-10 | End: 2021-03-16

## 2021-03-10 RX ORDER — DEXAMETHASONE 0.5 MG/5ML
10 ELIXIR ORAL ONCE
Refills: 0 | Status: COMPLETED | OUTPATIENT
Start: 2021-03-10 | End: 2021-03-10

## 2021-03-10 RX ORDER — AZITHROMYCIN 500 MG/1
500 TABLET, FILM COATED ORAL ONCE
Refills: 0 | Status: COMPLETED | OUTPATIENT
Start: 2021-03-10 | End: 2021-03-10

## 2021-03-10 RX ORDER — CEFUROXIME AXETIL 250 MG
500 TABLET ORAL ONCE
Refills: 0 | Status: COMPLETED | OUTPATIENT
Start: 2021-03-10 | End: 2021-03-10

## 2021-03-10 RX ORDER — SODIUM CHLORIDE 9 MG/ML
1000 INJECTION INTRAMUSCULAR; INTRAVENOUS; SUBCUTANEOUS ONCE
Refills: 0 | Status: COMPLETED | OUTPATIENT
Start: 2021-03-10 | End: 2021-03-10

## 2021-03-10 RX ADMIN — AZITHROMYCIN 500 MILLIGRAM(S): 500 TABLET, FILM COATED ORAL at 10:05

## 2021-03-10 RX ADMIN — Medication 10 MILLIGRAM(S): at 05:24

## 2021-03-10 RX ADMIN — Medication 500 MILLIGRAM(S): at 10:05

## 2021-03-10 RX ADMIN — SODIUM CHLORIDE 1000 MILLILITER(S): 9 INJECTION INTRAMUSCULAR; INTRAVENOUS; SUBCUTANEOUS at 05:13

## 2021-03-10 NOTE — ED PROVIDER NOTE - PATIENT PORTAL LINK FT
You can access the FollowMyHealth Patient Portal offered by [facility] by registering at the following website: [email or web address]. By joining [employer]’s FollowMyHealth portal, you will also be able to view your health information using other applications (apps) compatible with our system.

## 2021-03-10 NOTE — ED ADULT TRIAGE NOTE - CHIEF COMPLAINT QUOTE
discharged from hospital on monday for kidney stone and pneumonia. reports worsening productive cough and shortness of breath throughout day. tachypneic with frequent coughing at triage.   patient states she was not prescribed any antibiotics

## 2021-03-10 NOTE — ED PROVIDER NOTE - PHYSICAL EXAMINATION
Gen:  Well appearing in NAD  Head:  NC/AT  Cardiac: S1S2, tachycardic  Abd: Soft, non tender  Resp: tachypneic, CTA , stridourous cough  Ext: no deformities  Skin: warm and dry as visualized

## 2021-03-10 NOTE — ED PROVIDER NOTE - PROGRESS NOTE DETAILS
Aziza [place]EN[place]: Received s/o from Dr. Kaufman- patient's cough improved with decadron; feeling better; CTA concern for pna; will d/c macrobid- add cefuroxime and zithromax as per [facility] antibiotics guidelines- patient had been recently d/sonido from hospital; will repeat all vitals prior to d/c home; strict return precautions given.

## 2021-03-10 NOTE — ED ADULT NURSE NOTE - OBJECTIVE STATEMENT
24 y/o female comes into the ED with c/o of "croupy" cough. pt was seen on Friday for dx of kidney stones. pt states she has no other s/s except for cough. cardiac monitor reads Sinus tachycardia at a rate of 102. no fever and spo2 on room air is 100%. pt airway is patent, IV 20g in right AC, bloods collected, covid swab and urine sent out, EKG performed, call bell and bed in lowest position

## 2021-03-10 NOTE — ED PROVIDER NOTE - CLINICAL SUMMARY MEDICAL DECISION MAKING FREE TEXT BOX
pt with tachycardia, croupy cough will give decadron with recent hospitalization will check labs and d dimer

## 2021-03-10 NOTE — ED ADULT NURSE NOTE - CAS DISCH CONDITION
Acitretin Counseling:  I discussed with the patient the risks of acitretin including but not limited to hair loss, dry lips/skin/eyes, liver damage, hyperlipidemia, depression/suicidal ideation, photosensitivity.  Serious rare side effects can include but are not limited to pancreatitis, pseudotumor cerebri, bony changes, clot formation/stroke/heart attack.  Patient understands that alcohol is contraindicated since it can result in liver toxicity and significantly prolong the elimination of the drug by many years. Stable

## 2021-03-10 NOTE — ED PROVIDER NOTE - FAMILY HISTORY
Father  Still living? Yes, Estimated age: Age Unknown  FH: HTN (hypertension), Age at diagnosis: Age Unknown  FH: hyperlipidemia, Age at diagnosis: Age Unknown

## 2021-03-10 NOTE — ED PROVIDER NOTE - OBJECTIVE STATEMENT
26 yo f croupy cough recent hosp for 3 days due to kidney stones 24 yo f croupy cough recent hosp for 3 days due to kidney stones. hr in the 140 to 160 with stridorous cough and sob

## 2021-03-11 LAB
CULTURE RESULTS: SIGNIFICANT CHANGE UP
CULTURE RESULTS: SIGNIFICANT CHANGE UP
SPECIMEN SOURCE: SIGNIFICANT CHANGE UP
SPECIMEN SOURCE: SIGNIFICANT CHANGE UP

## 2021-03-15 NOTE — CDI QUERY NOTE - NSCDIOTHERTXTBX_GEN_ALL_CORE_HH
H&P documentation:  #UTI  ~f/u PAN C+S  ~cont. Ceftriaxone 1g IVP daily    3/14 Hospitalist documentation:  #UTI  -U/A significant for Large blood, ketone, nitrates, bacteria and trace Leuk esterase; WBC 16  -No urinary symptoms reported   -UCx: negative  -d/c ceftriaxone    DISCHARGE SUMMARY:  CT of the abdomen and pelvis performed in the ED revealed mild right hydroureteronephrosis secondary to a 3mm stone in the proximal right ureter.    It also showed partially imaged atelectasis in the right middle lobe versus a possible consolidation. For this reason, patient was started on Rocephin and azithromycin in the ED, which was discontinued during the course of her hospitalization as patient clinically did not appear to have PNA. Rather she likely had atelectasis secondary to poor inspiratory effort due to pain from nephrolithiasis.     Started on Flomax, told to strain her urine, given IVFs, and given appropriate pain control.      KUB X-ray done on day of discharge, personal review did not reveal a stone, but official read pending.      She will be discharged home with Flomax and Macrobid for 5 days.     Please clarify if the UTI ruled in or ruled out?  A) UTI ruled out  B) UTI ruled in ( Please document clinical indicators)  C) Unable to determine  D) Other ( Please specify condition)

## 2021-03-16 ENCOUNTER — APPOINTMENT (OUTPATIENT)
Dept: UROLOGY | Facility: CLINIC | Age: 26
End: 2021-03-16
Payer: COMMERCIAL

## 2021-03-16 ENCOUNTER — APPOINTMENT (OUTPATIENT)
Dept: RADIOLOGY | Facility: CLINIC | Age: 26
End: 2021-03-16
Payer: COMMERCIAL

## 2021-03-16 ENCOUNTER — OUTPATIENT (OUTPATIENT)
Dept: OUTPATIENT SERVICES | Facility: HOSPITAL | Age: 26
LOS: 1 days | End: 2021-03-16
Payer: COMMERCIAL

## 2021-03-16 VITALS
DIASTOLIC BLOOD PRESSURE: 78 MMHG | BODY MASS INDEX: 19.49 KG/M2 | HEIGHT: 63 IN | WEIGHT: 110 LBS | HEART RATE: 46 BPM | SYSTOLIC BLOOD PRESSURE: 113 MMHG

## 2021-03-16 DIAGNOSIS — Z88.5 ALLERGY STATUS TO NARCOTIC AGENT: ICD-10-CM

## 2021-03-16 DIAGNOSIS — N20.1 CALCULUS OF URETER: ICD-10-CM

## 2021-03-16 DIAGNOSIS — Z88.0 ALLERGY STATUS TO PENICILLIN: ICD-10-CM

## 2021-03-16 DIAGNOSIS — N13.2 HYDRONEPHROSIS WITH RENAL AND URETERAL CALCULOUS OBSTRUCTION: ICD-10-CM

## 2021-03-16 DIAGNOSIS — R31.9 HEMATURIA, UNSPECIFIED: ICD-10-CM

## 2021-03-16 DIAGNOSIS — Z91.013 ALLERGY TO SEAFOOD: ICD-10-CM

## 2021-03-16 DIAGNOSIS — J98.11 ATELECTASIS: ICD-10-CM

## 2021-03-16 DIAGNOSIS — Z90.89 ACQUIRED ABSENCE OF OTHER ORGANS: Chronic | ICD-10-CM

## 2021-03-16 DIAGNOSIS — Z88.2 ALLERGY STATUS TO SULFONAMIDES: ICD-10-CM

## 2021-03-16 PROCEDURE — 74018 RADEX ABDOMEN 1 VIEW: CPT

## 2021-03-16 PROCEDURE — 99213 OFFICE O/P EST LOW 20 MIN: CPT

## 2021-03-16 PROCEDURE — 74018 RADEX ABDOMEN 1 VIEW: CPT | Mod: 26

## 2021-03-16 PROCEDURE — 99072 ADDL SUPL MATRL&STAF TM PHE: CPT

## 2021-03-16 NOTE — END OF VISIT
[FreeTextEntry3] : A urine is sent and a sonogram of the kidneys and KUB x-ray ordered.\par Hopefully this will show that she has passed her stone.  Prevention was gone over in detail with the patient and will start with hydration and the use of lemon/lime-based beverages.  A renal sonogram will be done in 1 year to check on stone formation

## 2021-03-16 NOTE — REVIEW OF SYSTEMS
[Date of last menstrual period ____] : date of last menstrual period: [unfilled] [Urine Infection (bladder/kidney)] : bladder/kidney infection [History of kidney stones] : history of kidney stones [see HPI] : see HPI [Negative] : Heme/Lymph

## 2021-03-16 NOTE — HISTORY OF PRESENT ILLNESS
[FreeTextEntry1] : This patient was seen in the hospital for ureterolithiasis.  She has been completely asymptomatic since that time.  She had no prior history of stone formation.

## 2021-03-17 ENCOUNTER — APPOINTMENT (OUTPATIENT)
Dept: ULTRASOUND IMAGING | Facility: CLINIC | Age: 26
End: 2021-03-17
Payer: COMMERCIAL

## 2021-03-17 ENCOUNTER — OUTPATIENT (OUTPATIENT)
Dept: OUTPATIENT SERVICES | Facility: HOSPITAL | Age: 26
LOS: 1 days | End: 2021-03-17
Payer: COMMERCIAL

## 2021-03-17 DIAGNOSIS — Z00.8 ENCOUNTER FOR OTHER GENERAL EXAMINATION: ICD-10-CM

## 2021-03-17 DIAGNOSIS — N20.1 CALCULUS OF URETER: ICD-10-CM

## 2021-03-17 DIAGNOSIS — Z90.89 ACQUIRED ABSENCE OF OTHER ORGANS: Chronic | ICD-10-CM

## 2021-03-17 PROCEDURE — 76775 US EXAM ABDO BACK WALL LIM: CPT

## 2021-03-17 PROCEDURE — 76775 US EXAM ABDO BACK WALL LIM: CPT | Mod: 26

## 2021-03-19 ENCOUNTER — NON-APPOINTMENT (OUTPATIENT)
Age: 26
End: 2021-03-19

## 2021-09-27 NOTE — DISCHARGE NOTE NURSING/CASE MANAGEMENT/SOCIAL WORK - NSFLUVACAGEDISCH_IMM_ALL_CORE
September 28, 2021      Kole Sherman  [address]  [facility] [address]        Dear Mr. Sherman,    The results of your colonoscopy performed on September 23, 2021 have returned and indicate the following:    The colon polyps removed were benign ( not cancerous). Recommend to repeat colonoscopy in 5 years.    · Tubular Adenoma Polyp(s)  Tubular adenomas are growths of tissue in the colon that can be pre-cancerous.  Please note, if these polyps are not removed, over time they can lead to colon cancer.  Although your polyp(s) were removed during the procedure, these types of polyps can reoccur and other polyps may develop.    It is important for you to be re-screened in the future for any polyps that may re-occur. Colonoscopies remain the best examination for follow-up with patients who have had polyps removed.      My office will send you a reminder letter when it is time for you to schedule another colon screening procedure in 5 years.     It has been a pleasure caring for you. If you have questions or concerns regarding these test results or your plan of care, please feel free to contact us. You may either contact us by phone, MyChart or schedule a follow-up office visit to go over these results.      Sincerely,          Rajesh Jackson MD  [facility] GI Department  [address] [place] [address] [place] [address]  [phone]   Adult

## 2022-03-05 ENCOUNTER — APPOINTMENT (OUTPATIENT)
Dept: ULTRASOUND IMAGING | Facility: CLINIC | Age: 27
End: 2022-03-05
Payer: COMMERCIAL

## 2022-03-05 ENCOUNTER — OUTPATIENT (OUTPATIENT)
Dept: OUTPATIENT SERVICES | Facility: HOSPITAL | Age: 27
LOS: 1 days | End: 2022-03-05
Payer: COMMERCIAL

## 2022-03-05 DIAGNOSIS — N20.1 CALCULUS OF URETER: ICD-10-CM

## 2022-03-05 DIAGNOSIS — Z90.89 ACQUIRED ABSENCE OF OTHER ORGANS: Chronic | ICD-10-CM

## 2022-03-05 PROCEDURE — 76775 US EXAM ABDO BACK WALL LIM: CPT

## 2022-03-05 PROCEDURE — 76775 US EXAM ABDO BACK WALL LIM: CPT | Mod: 26

## 2022-03-07 ENCOUNTER — NON-APPOINTMENT (OUTPATIENT)
Age: 27
End: 2022-03-07

## 2022-03-10 ENCOUNTER — LABORATORY RESULT (OUTPATIENT)
Age: 27
End: 2022-03-10

## 2022-03-14 ENCOUNTER — APPOINTMENT (OUTPATIENT)
Dept: UROLOGY | Facility: CLINIC | Age: 27
End: 2022-03-14
Payer: COMMERCIAL

## 2022-03-14 VITALS
HEIGHT: 63 IN | BODY MASS INDEX: 19.84 KG/M2 | WEIGHT: 112 LBS | SYSTOLIC BLOOD PRESSURE: 114 MMHG | HEART RATE: 71 BPM | OXYGEN SATURATION: 99 % | DIASTOLIC BLOOD PRESSURE: 77 MMHG

## 2022-03-14 DIAGNOSIS — N20.1 CALCULUS OF URETER: ICD-10-CM

## 2022-03-14 PROCEDURE — 99213 OFFICE O/P EST LOW 20 MIN: CPT

## 2022-03-14 NOTE — HISTORY OF PRESENT ILLNESS
[FreeTextEntry1] : This patient is here for a checkup.  She had formed stones in the past but her sonogram is normal and she is asymptomatic at this time

## 2024-10-28 NOTE — ED PROVIDER NOTE - RESPIRATORY [+], MLM
10/28/2024      Jessica Sanches  [address] [place] [address]      Dear Colleague,    Thank you for referring your patient, Jessica Sanches, to the [facility] [place]. Please see a copy of my visit note below.    ASSESSMENT & PLAN    Jessica was seen today for pain.    Diagnoses and all orders for this visit:    Right wrist pain  -     XR Wrist Right G/E 3 Views; Future  -     Wrist/Arm/Hand Bracing Supplies Order Wrist Brace; Right; with thumb spica    Pain and swelling of right wrist  -     Wrist/Arm/Hand Bracing Supplies Order Wrist Brace; Right; with thumb spica    Radial styloid tenosynovitis      This issue is chronic and Unchanged.      ICD-10-CM    1. Right wrist pain  M25.531 XR Wrist Right G/E 3 Views     Wrist/Arm/Hand Bracing Supplies Order Wrist Brace; Right; with thumb spica      2. Pain and swelling of right wrist  M25.531 Wrist/Arm/Hand Bracing Supplies Order Wrist Brace; Right; with thumb spica    M25.431       3. Radial styloid tenosynovitis  M65.4           Differential included radial styloid tenosynovitis, possible ganglion cyst as well.    We discussed the following treatment options: symptom treatment, activity modification/rest, imaging, rehab and referral. Following discussion, plan: will start with bracing.    Plan:  - Today's Plan of Care:  Thumb spica bracing    Discussed activity considerations and other supportive care including Ice/Heat, OTC and other topical medications as needed.    -We also discussed other future treatment options:  Referral to Occupational therapy  US guided procedure (injection v. Ganglion cyst aspiration)  MRI if not improving    Follow Up: 3-6 weeks  Can continue to follow up with me for further treatment recommendations      Concerning signs and symptoms were reviewed and all questions were answered at this time.    Edith Bangura MD [facility]  Sports Medicine Physician  [facility]    -----  Chief  Complaint   Patient presents with     Right Wrist - Pain       SUBJECTIVE  Jessica Sanches is a/an 53 year old female who is seen as a self referral for evaluation of right wrist.     The patient is seen by themselves.  The patient is Right handed    Onset: 10 month(s) ago. Reports insidious onset without acute precipitating event.  Location of Pain: right wrist; distal radius  Worsened by: grabbing, pulling, mousing, computer   Better with: icing, heating, voltaren   Treatments tried: ice, heat, casting/splinting/bracing, and compression sleeve and immobilize   Associated symptoms: swelling, weakness of right wrist, and bump noted     Orthopedic/Surgical history: NO  Social History/Occupation: working at a desk       REVIEW OF SYSTEMS:  Review of Systems    OBJECTIVE:  There were no vitals taken for this visit.   General: healthy, alert and in no distress  Skin: no suspicious lesions or rash.  CV: distal perfusion intact   Resp: normal respiratory effort without conversational dyspnea   Psych: normal mood and affect  Gait: NORMAL  Neuro: Normal light sensory exam of upper extremity    Right Wrist and Hand exam    Inspection:       Swelling: right radial wrist    Tender:       Radial styloid right wrist    Non Tender:       Remainder of the Wrist and Hand bilateral    ROM:       Full and symmetric active and passive range of motion of the forearm, wrist and digits bilateral    Strength:       5/5 strength in the muscles of the hand, wrist and forearm right    Special Tests:        positive (+) Finkelstein's maneuver right - mildly    Neurovascular:       2+ radial pulses bilaterally with brisk capillary refill and      normal sensation to light touch in the radial, median and ulnar nerve distributions     RADIOLOGY:  Final results and radiologist's interpretation, available in the [facility] health record.  Images were reviewed with the patient in the office today.  My personal interpretation of the performed  imaging:    3 XR views of right wrist reviewed: no acute bony abnormality, no significant degenerative change  - will follow official read      Review of the result(s) of each unique test - XR             Again, thank you for allowing me to participate in the care of your patient.        Sincerely,        Edith Bangura MD   stridor/COUGH/SHORTNESS OF BREATH

## 2025-06-29 ENCOUNTER — INPATIENT (INPATIENT)
Facility: HOSPITAL | Age: 30
LOS: 0 days | Discharge: HOME | DRG: 195 | End: 2025-06-30
Attending: INTERNAL MEDICINE | Admitting: INTERNAL MEDICINE
Payer: COMMERCIAL

## 2025-06-29 VITALS — HEIGHT: 63 IN

## 2025-06-29 DIAGNOSIS — J18.9 PNEUMONIA, UNSPECIFIED ORGANISM: ICD-10-CM

## 2025-06-29 DIAGNOSIS — Z90.89 ACQUIRED ABSENCE OF OTHER ORGANS: Chronic | ICD-10-CM

## 2025-06-29 LAB
ADD ON TEST-SPECIMEN IN LAB: SIGNIFICANT CHANGE UP
ALBUMIN SERPL ELPH-MCNC: 3.7 G/DL — SIGNIFICANT CHANGE UP (ref 3.3–5)
ALP SERPL-CCNC: 72 U/L — SIGNIFICANT CHANGE UP (ref 40–120)
ALT FLD-CCNC: 14 U/L — SIGNIFICANT CHANGE UP (ref 12–78)
ANION GAP SERPL CALC-SCNC: 5 MMOL/L — SIGNIFICANT CHANGE UP (ref 5–17)
AST SERPL-CCNC: 15 U/L — SIGNIFICANT CHANGE UP (ref 15–37)
BASOPHILS # BLD AUTO: 0.06 K/UL — SIGNIFICANT CHANGE UP (ref 0–0.2)
BASOPHILS NFR BLD AUTO: 0.3 % — SIGNIFICANT CHANGE UP (ref 0–2)
BILIRUB SERPL-MCNC: 0.5 MG/DL — SIGNIFICANT CHANGE UP (ref 0.2–1.2)
BUN SERPL-MCNC: 9 MG/DL — SIGNIFICANT CHANGE UP (ref 7–23)
CALCIUM SERPL-MCNC: 8.9 MG/DL — SIGNIFICANT CHANGE UP (ref 8.5–10.1)
CHLORIDE SERPL-SCNC: 109 MMOL/L — HIGH (ref 96–108)
CO2 SERPL-SCNC: 23 MMOL/L — SIGNIFICANT CHANGE UP (ref 22–31)
CREAT SERPL-MCNC: 0.74 MG/DL — SIGNIFICANT CHANGE UP (ref 0.5–1.3)
D DIMER BLD IA.RAPID-MCNC: 182 NG/ML DDU — SIGNIFICANT CHANGE UP
EGFR: 112 ML/MIN/1.73M2 — SIGNIFICANT CHANGE UP
EGFR: 112 ML/MIN/1.73M2 — SIGNIFICANT CHANGE UP
EOSINOPHIL # BLD AUTO: 0.24 K/UL — SIGNIFICANT CHANGE UP (ref 0–0.5)
EOSINOPHIL NFR BLD AUTO: 1.1 % — SIGNIFICANT CHANGE UP (ref 0–6)
FLUAV AG NPH QL: SIGNIFICANT CHANGE UP
FLUBV AG NPH QL: SIGNIFICANT CHANGE UP
GLUCOSE SERPL-MCNC: 101 MG/DL — HIGH (ref 70–99)
HCT VFR BLD CALC: 41.7 % — SIGNIFICANT CHANGE UP (ref 34.5–45)
HGB BLD-MCNC: 14 G/DL — SIGNIFICANT CHANGE UP (ref 11.5–15.5)
IMM GRANULOCYTES # BLD AUTO: 0.1 K/UL — HIGH (ref 0–0.07)
IMM GRANULOCYTES NFR BLD AUTO: 0.5 % — SIGNIFICANT CHANGE UP (ref 0–0.9)
LACTATE SERPL-SCNC: 5.5 MMOL/L — CRITICAL HIGH (ref 0.7–2)
LYMPHOCYTES # BLD AUTO: 2.97 K/UL — SIGNIFICANT CHANGE UP (ref 1–3.3)
LYMPHOCYTES NFR BLD AUTO: 13.7 % — SIGNIFICANT CHANGE UP (ref 13–44)
MCHC RBC-ENTMCNC: 32.1 PG — SIGNIFICANT CHANGE UP (ref 27–34)
MCHC RBC-ENTMCNC: 33.6 G/DL — SIGNIFICANT CHANGE UP (ref 32–36)
MCV RBC AUTO: 95.6 FL — SIGNIFICANT CHANGE UP (ref 80–100)
MONOCYTES # BLD AUTO: 1.43 K/UL — HIGH (ref 0–0.9)
MONOCYTES NFR BLD AUTO: 6.6 % — SIGNIFICANT CHANGE UP (ref 2–14)
NEUTROPHILS # BLD AUTO: 16.87 K/UL — HIGH (ref 1.8–7.4)
NEUTROPHILS NFR BLD AUTO: 77.8 % — HIGH (ref 43–77)
NRBC # BLD AUTO: 0 K/UL — SIGNIFICANT CHANGE UP (ref 0–0)
NRBC # FLD: 0 K/UL — SIGNIFICANT CHANGE UP (ref 0–0)
NRBC BLD AUTO-RTO: 0 /100 WBCS — SIGNIFICANT CHANGE UP (ref 0–0)
NT-PROBNP SERPL-SCNC: 52 PG/ML — SIGNIFICANT CHANGE UP (ref 0–125)
PLATELET # BLD AUTO: 232 K/UL — SIGNIFICANT CHANGE UP (ref 150–400)
PMV BLD: 10 FL — SIGNIFICANT CHANGE UP (ref 7–13)
POTASSIUM SERPL-MCNC: 4 MMOL/L — SIGNIFICANT CHANGE UP (ref 3.5–5.3)
POTASSIUM SERPL-SCNC: 4 MMOL/L — SIGNIFICANT CHANGE UP (ref 3.5–5.3)
PROT SERPL-MCNC: 7.7 GM/DL — SIGNIFICANT CHANGE UP (ref 6–8.3)
RBC # BLD: 4.36 M/UL — SIGNIFICANT CHANGE UP (ref 3.8–5.2)
RBC # FLD: 11.7 % — SIGNIFICANT CHANGE UP (ref 10.3–14.5)
RSV RNA NPH QL NAA+NON-PROBE: SIGNIFICANT CHANGE UP
SARS-COV-2 RNA SPEC QL NAA+PROBE: SIGNIFICANT CHANGE UP
SODIUM SERPL-SCNC: 137 MMOL/L — SIGNIFICANT CHANGE UP (ref 135–145)
SOURCE RESPIRATORY: SIGNIFICANT CHANGE UP
WBC # BLD: 21.67 K/UL — HIGH (ref 3.8–10.5)
WBC # FLD AUTO: 21.67 K/UL — HIGH (ref 3.8–10.5)

## 2025-06-29 PROCEDURE — 83605 ASSAY OF LACTIC ACID: CPT

## 2025-06-29 PROCEDURE — 99285 EMERGENCY DEPT VISIT HI MDM: CPT

## 2025-06-29 PROCEDURE — 99223 1ST HOSP IP/OBS HIGH 75: CPT

## 2025-06-29 PROCEDURE — 71275 CT ANGIOGRAPHY CHEST: CPT | Mod: 26

## 2025-06-29 PROCEDURE — 80048 BASIC METABOLIC PNL TOTAL CA: CPT

## 2025-06-29 PROCEDURE — 71045 X-RAY EXAM CHEST 1 VIEW: CPT | Mod: 26

## 2025-06-29 PROCEDURE — 93010 ELECTROCARDIOGRAM REPORT: CPT

## 2025-06-29 PROCEDURE — 94640 AIRWAY INHALATION TREATMENT: CPT

## 2025-06-29 PROCEDURE — 36415 COLL VENOUS BLD VENIPUNCTURE: CPT

## 2025-06-29 PROCEDURE — 85027 COMPLETE CBC AUTOMATED: CPT

## 2025-06-29 RX ORDER — IPRATROPIUM BROMIDE AND ALBUTEROL SULFATE .5; 2.5 MG/3ML; MG/3ML
3 SOLUTION RESPIRATORY (INHALATION) EVERY 6 HOURS
Refills: 0 | Status: DISCONTINUED | OUTPATIENT
Start: 2025-06-29 | End: 2025-06-30

## 2025-06-29 RX ORDER — MELATONIN 5 MG
3 TABLET ORAL AT BEDTIME
Refills: 0 | Status: DISCONTINUED | OUTPATIENT
Start: 2025-06-29 | End: 2025-06-30

## 2025-06-29 RX ORDER — DEXAMETHASONE 0.5 MG/1
6 TABLET ORAL ONCE
Refills: 0 | Status: COMPLETED | OUTPATIENT
Start: 2025-06-29 | End: 2025-06-29

## 2025-06-29 RX ORDER — AZITHROMYCIN 250 MG
500 CAPSULE ORAL EVERY 24 HOURS
Refills: 0 | Status: DISCONTINUED | OUTPATIENT
Start: 2025-06-30 | End: 2025-06-30

## 2025-06-29 RX ORDER — IPRATROPIUM BROMIDE AND ALBUTEROL SULFATE .5; 2.5 MG/3ML; MG/3ML
3 SOLUTION RESPIRATORY (INHALATION)
Refills: 0 | Status: COMPLETED | OUTPATIENT
Start: 2025-06-29 | End: 2025-06-29

## 2025-06-29 RX ORDER — CEFTRIAXONE 500 MG/1
1000 INJECTION, POWDER, FOR SOLUTION INTRAMUSCULAR; INTRAVENOUS EVERY 24 HOURS
Refills: 0 | Status: DISCONTINUED | OUTPATIENT
Start: 2025-06-29 | End: 2025-06-30

## 2025-06-29 RX ORDER — ACETAMINOPHEN 500 MG/5ML
650 LIQUID (ML) ORAL EVERY 6 HOURS
Refills: 0 | Status: DISCONTINUED | OUTPATIENT
Start: 2025-06-29 | End: 2025-06-30

## 2025-06-29 RX ORDER — CEFTRIAXONE 500 MG/1
1000 INJECTION, POWDER, FOR SOLUTION INTRAMUSCULAR; INTRAVENOUS ONCE
Refills: 0 | Status: COMPLETED | OUTPATIENT
Start: 2025-06-29 | End: 2025-06-29

## 2025-06-29 RX ORDER — AZITHROMYCIN 250 MG
500 CAPSULE ORAL ONCE
Refills: 0 | Status: COMPLETED | OUTPATIENT
Start: 2025-06-29 | End: 2025-06-29

## 2025-06-29 RX ORDER — CEFTRIAXONE 500 MG/1
1000 INJECTION, POWDER, FOR SOLUTION INTRAMUSCULAR; INTRAVENOUS EVERY 24 HOURS
Refills: 0 | Status: DISCONTINUED | OUTPATIENT
Start: 2025-06-29 | End: 2025-06-29

## 2025-06-29 RX ORDER — ONDANSETRON HCL/PF 4 MG/2 ML
4 VIAL (ML) INJECTION EVERY 8 HOURS
Refills: 0 | Status: DISCONTINUED | OUTPATIENT
Start: 2025-06-29 | End: 2025-06-30

## 2025-06-29 RX ORDER — ACETAMINOPHEN 500 MG/5ML
1000 LIQUID (ML) ORAL ONCE
Refills: 0 | Status: COMPLETED | OUTPATIENT
Start: 2025-06-29 | End: 2025-06-29

## 2025-06-29 RX ORDER — MAGNESIUM, ALUMINUM HYDROXIDE 200-200 MG
30 TABLET,CHEWABLE ORAL EVERY 4 HOURS
Refills: 0 | Status: DISCONTINUED | OUTPATIENT
Start: 2025-06-29 | End: 2025-06-30

## 2025-06-29 RX ADMIN — DEXAMETHASONE 6 MILLIGRAM(S): 0.5 TABLET ORAL at 17:00

## 2025-06-29 RX ADMIN — Medication 1000 MILLILITER(S): at 17:08

## 2025-06-29 RX ADMIN — CEFTRIAXONE 1000 MILLIGRAM(S): 500 INJECTION, POWDER, FOR SOLUTION INTRAMUSCULAR; INTRAVENOUS at 21:36

## 2025-06-29 RX ADMIN — IPRATROPIUM BROMIDE AND ALBUTEROL SULFATE 3 MILLILITER(S): .5; 2.5 SOLUTION RESPIRATORY (INHALATION) at 17:00

## 2025-06-29 RX ADMIN — Medication 400 MILLIGRAM(S): at 17:03

## 2025-06-29 RX ADMIN — Medication 1000 MILLILITER(S): at 22:02

## 2025-06-29 RX ADMIN — IPRATROPIUM BROMIDE AND ALBUTEROL SULFATE 3 MILLILITER(S): .5; 2.5 SOLUTION RESPIRATORY (INHALATION) at 17:52

## 2025-06-29 RX ADMIN — Medication 250 MILLIGRAM(S): at 21:37

## 2025-06-29 RX ADMIN — IPRATROPIUM BROMIDE AND ALBUTEROL SULFATE 3 MILLILITER(S): .5; 2.5 SOLUTION RESPIRATORY (INHALATION) at 17:24

## 2025-06-29 NOTE — ED PROVIDER NOTE - OBJECTIVE STATEMENT
31 y/o female with no pertinent HX presents to the ED with SOB/Cough for 4 days. Claims she has been coughing since this morning and has been wheezing. Endorses intermittent fever which Tylenol helps, runny nose, sore chest. Denies nausea, vomiting, sore throat, abdominal pain. Denies HX of asthma, HX of smoking, sick contacts.

## 2025-06-29 NOTE — H&P ADULT - HISTORY OF PRESENT ILLNESS
Patient is a 30y old  Female who presents with a chief complaint of cough and intermittent fever for 3 days. She has no pertinent medical history. She states that she has been coughing without improvement with night time fevers for the past 3-4 days. She take Tylenol at home and that lowers the fevers. However, she feels that she is getting worse and having some difficulty breathing today. As such, she came into the ED.     PAST MEDICAL & SURGICAL HISTORY:  No pertinent past medical history      S/P tonsillectomy        FAMILY HISTORY:  FH: HTN (hypertension) (Father)  father    FH: hyperlipidemia (Father)  father      Social History:      Allergies    Nuts (Unknown)  penicillins (Other)  codeine (Other)  shellfish (Other (Severe))    Intolerances        MEDICATIONS  (STANDING):  albuterol/ipratropium for Nebulization 3 milliLiter(s) Nebulizer every 6 hours  azithromycin  IVPB 500 milliGRAM(s) IV Intermittent every 24 hours  cefTRIAXone Injectable. 1000 milliGRAM(s) IV Push every 24 hours    MEDICATIONS  (PRN):  acetaminophen     Tablet .. 650 milliGRAM(s) Oral every 6 hours PRN Mild Pain (1 - 3)  aluminum hydroxide/magnesium hydroxide/simethicone Suspension 30 milliLiter(s) Oral every 4 hours PRN Dyspepsia  melatonin 3 milliGRAM(s) Oral at bedtime PRN Insomnia  ondansetron Injectable 4 milliGRAM(s) IV Push every 8 hours PRN Nausea and/or Vomiting      ROS:  General:  fevers  Skin: No rash or bothersome skin lesions  Musculoskeletal: No arthalgias, myalgias or joint swelling  Eyes: No visual changes or eye pain  Ears: No hearing loss , otorrhea or ear pain  Nose, Mouth, Throat: No nasal congestion, rhinorrhea, oral lesions, postnasal drip or sore throat  Cardio: No chest pain or palpitations. no lower extremity edema. no syncope. no claudication.   Respiratory: cough and shortness of breath    GI: No diarrhea, constipation, blood in stools, abdominal pain, vomiting or heartburn  : No urinary frequency, hematuria, incontinence, or dysuria  Neurologic: No headaches, parasthesias, confusion, dysarthria or gait instability  Psychiatric:  No anxiety or depression  Lymphatic:  No easy bruising, easy bleeding or swollen glands  Allergic: No itching, sneezing , watery eyes, clear rhinorrhea or recurrent infections    PEx  T(C): 37 (06-29-25 @ 22:04), Max: 37 (06-29-25 @ 22:04)  HR: 112 (06-29-25 @ 22:04) (112 - 128)  BP: 120/82 (06-29-25 @ 22:04) (118/69 - 134/79)  RR: 18 (06-29-25 @ 22:04) (18 - 19)  SpO2: 99% (06-29-25 @ 22:04) (99% - 100%)  Wt(kg): --  General:     no acute distress, no identifying marks , scars, or tattoos.  Skin: no rash or prominent lesions  Head: normocephalic, atraumatic     Sinuses: non-tender  Nose: no external lesions, mucosa non-inflamed, septum and turbinates normal  Throat: no erythema, exudates or lesions.  Neck: Supple without lymphadenopathy. Thyroid no thyromegaly, no palpable thyroid nodules, no palpable nodules or masses, carotid arteries no bruits.   Breasts: No palpable masses or lesions.  Heart: RRR, no murmur or gallop.  Normal S1, S2.  No S3, S4.   Lungs: decreased air movement in the LLL   Chest wall: Normal insp   Abdomen:  Soft, NT/ND, normal bowel sounds, no HSM, no masses.  No peritoneal signs.   Back: spine normal without deformity or tenderness.  Normal ROM   : Exam normal.  no inguinal hernias.  Extremities: No deformities, clubbing, cyanosis, or edema.  Musculoskeletal: Normal gait and station. No decreased range of motion, instability, atrophy or abnormal strength or tone in the head, neck, spine, ribs, pelvis or extremities.   Neurologic: CN 2-12 normal. Sensation to pain, touch and proprioception normal. DTRs normal in upper and lower extremities. No pathologic reflexes.  Motor normal.  Psychiatric: Oriented X3, intact recent and remote memory, judgement and insight, normal mood and affect.                          14.0   21.67 )-----------( 232      ( 29 Jun 2025 16:53 )             41.7     06-29    137  |  109[H]  |  9   ----------------------------<  101[H]  4.0   |  23  |  0.74    Ca    8.9      29 Jun 2025 16:53    TPro  7.7  /  Alb  3.7  /  TBili  0.5  /  DBili  x   /  AST  15  /  ALT  14  /  AlkPhos  72  06-29    CAPILLARY BLOOD GLUCOSE          Urinalysis Basic - ( 29 Jun 2025 16:53 )    Color: x / Appearance: x / SG: x / pH: x  Gluc: 101 mg/dL / Ketone: x  / Bili: x / Urobili: x   Blood: x / Protein: x / Nitrite: x   Leuk Esterase: x / RBC: x / WBC x   Sq Epi: x / Non Sq Epi: x / Bacteria: x          Radiology/Imaging, I have personally reviewed:  ct chest

## 2025-06-29 NOTE — ED PROVIDER NOTE - CLINICAL SUMMARY MEDICAL DECISION MAKING FREE TEXT BOX
31 y/o female with no pertinent HX presents to the ED with SOB/Cough for 4 days. Claims she has been coughing since this morning and has been wheezing. Patient tachycardic, will rule out Pneumonia versus PE, low suspicion tough possible asthma,  plan to do labs, Chest-Xray, Nebs, Steroids, IV Fluids and re-eval.

## 2025-06-29 NOTE — PATIENT PROFILE ADULT - DO YOU FEEL LIKE HURTING YOURSELF OR OTHERS?
no
[facility]  Neurology  [address] [place] [address] [address] [place] [address]  Phone: [phone]  Fax:

## 2025-06-29 NOTE — H&P ADULT - ASSESSMENT
30/F with no med hx presents with LLL PNA     PNA, likely gram negative source  Not hypoxic  Ceftriaxone and Azithromycin   Duoneb  Blood cultures pending     Lactic acidosis  LA 5.5   NS 2 L total, second L running now  Recheck LA after NS bolus     SCD for DVT PPx

## 2025-06-29 NOTE — ED PROVIDER NOTE - PROGRESS NOTE DETAILS
Angel: pt 31 yo f 3 weeks of cough worsening over the last week. 2 weeks ago took zpak and medrol dose pack. tmax 101. SOB, LOPEZ. tachy btwn 100-140. sating 100% on RA. WBC 21. LLL PNA. I, CHRISTEL Shultz personally discussed the case with consultant, Dr. Berrios attending hospitalist, about pt who has PNA and will need hospital admission."

## 2025-06-29 NOTE — ED PROVIDER NOTE - PHYSICAL EXAMINATION
General: Patient in no acute distress, AAOX3.   HENMT: NC/AT, no nasal congestion, MMM  Neck: supple  CVS: regular rate and rhythm, no murmur  Resp: Coarse breath sounds, positive diffuse wheezing   Abd: Soft non tender, non distended, +bowel sounds, No guarding, rebound tenderness   Ext: FROM in all ext, 2+ pulses throughout  BACK: no midline tenderness, no stepoffs, no CVA ttp  NEURO: no focal deficit, gross motor and sensory intact throughout, gait stable.

## 2025-06-29 NOTE — ED PROVIDER NOTE - AVIAN FLU EXPOSURE
Problem: Adult Inpatient Plan of Care  Goal: Plan of Care Review  Outcome: Met  Goal: Patient-Specific Goal (Individualized)  Outcome: Met  Goal: Absence of Hospital-Acquired Illness or Injury  Outcome: Met  Goal: Optimal Comfort and Wellbeing  Outcome: Met  Goal: Readiness for Transition of Care  Outcome: Met      No

## 2025-06-29 NOTE — ED ADULT NURSE NOTE - OBJECTIVE STATEMENT
Presenting to the ed C/O SOB and non productive coughs x 1 day unrelieved with albuterol treatments. Pt endorsing fevers tmax 101, denies chills or sick contacts. Pt has been taking tylenol with partial relief

## 2025-06-29 NOTE — ED ADULT TRIAGE NOTE - CHIEF COMPLAINT QUOTE
Pt c/o cough x4 weeks with shortness of breath and chest pain starting today. Unrelieved by inhaler. STAT EKG to be completed.  -PMH

## 2025-06-29 NOTE — ED PROVIDER NOTE - ATTENDING APP SHARED VISIT CONTRIBUTION OF CARE
I, Winston Bonilla DO, personally saw the patient with ACP.  I have personally performed a face to face diagnostic evaluation on this patient.  I have reviewed the ACP note and agree with the history, exam, and plan of care, except as noted.  I personally saw the patient and performed a substantive portion of the visit including all aspects of the medical decision making.

## 2025-06-30 ENCOUNTER — TRANSCRIPTION ENCOUNTER (OUTPATIENT)
Age: 30
End: 2025-06-30

## 2025-06-30 VITALS
TEMPERATURE: 98 F | HEART RATE: 75 BPM | OXYGEN SATURATION: 98 % | DIASTOLIC BLOOD PRESSURE: 62 MMHG | SYSTOLIC BLOOD PRESSURE: 116 MMHG | RESPIRATION RATE: 16 BRPM

## 2025-06-30 LAB
ANION GAP SERPL CALC-SCNC: 4 MMOL/L — LOW (ref 5–17)
BUN SERPL-MCNC: 5 MG/DL — LOW (ref 7–23)
CALCIUM SERPL-MCNC: 8.8 MG/DL — SIGNIFICANT CHANGE UP (ref 8.5–10.1)
CHLORIDE SERPL-SCNC: 115 MMOL/L — HIGH (ref 96–108)
CO2 SERPL-SCNC: 21 MMOL/L — LOW (ref 22–31)
CREAT SERPL-MCNC: 0.46 MG/DL — LOW (ref 0.5–1.3)
EGFR: 132 ML/MIN/1.73M2 — SIGNIFICANT CHANGE UP
EGFR: 132 ML/MIN/1.73M2 — SIGNIFICANT CHANGE UP
GLUCOSE SERPL-MCNC: 133 MG/DL — HIGH (ref 70–99)
HCT VFR BLD CALC: 35.8 % — SIGNIFICANT CHANGE UP (ref 34.5–45)
HGB BLD-MCNC: 12.1 G/DL — SIGNIFICANT CHANGE UP (ref 11.5–15.5)
LACTATE SERPL-SCNC: 2.6 MMOL/L — HIGH (ref 0.7–2)
LACTATE SERPL-SCNC: 4.4 MMOL/L — CRITICAL HIGH (ref 0.7–2)
MCHC RBC-ENTMCNC: 32.4 PG — SIGNIFICANT CHANGE UP (ref 27–34)
MCHC RBC-ENTMCNC: 33.8 G/DL — SIGNIFICANT CHANGE UP (ref 32–36)
MCV RBC AUTO: 96 FL — SIGNIFICANT CHANGE UP (ref 80–100)
NRBC # BLD AUTO: 0 K/UL — SIGNIFICANT CHANGE UP (ref 0–0)
NRBC # FLD: 0 K/UL — SIGNIFICANT CHANGE UP (ref 0–0)
NRBC BLD AUTO-RTO: 0 /100 WBCS — SIGNIFICANT CHANGE UP (ref 0–0)
PLATELET # BLD AUTO: 187 K/UL — SIGNIFICANT CHANGE UP (ref 150–400)
PMV BLD: 9.9 FL — SIGNIFICANT CHANGE UP (ref 7–13)
POTASSIUM SERPL-MCNC: 4.1 MMOL/L — SIGNIFICANT CHANGE UP (ref 3.5–5.3)
POTASSIUM SERPL-SCNC: 4.1 MMOL/L — SIGNIFICANT CHANGE UP (ref 3.5–5.3)
RBC # BLD: 3.73 M/UL — LOW (ref 3.8–5.2)
RBC # FLD: 11.9 % — SIGNIFICANT CHANGE UP (ref 10.3–14.5)
SODIUM SERPL-SCNC: 140 MMOL/L — SIGNIFICANT CHANGE UP (ref 135–145)
WBC # BLD: 14.63 K/UL — HIGH (ref 3.8–10.5)
WBC # FLD AUTO: 14.63 K/UL — HIGH (ref 3.8–10.5)

## 2025-06-30 PROCEDURE — 99239 HOSP IP/OBS DSCHRG MGMT >30: CPT

## 2025-06-30 RX ORDER — CEFUROXIME SODIUM 1.5 G
1 VIAL (EA) INJECTION
Qty: 10 | Refills: 0
Start: 2025-06-30 | End: 2025-07-04

## 2025-06-30 RX ORDER — IPRATROPIUM BROMIDE AND ALBUTEROL SULFATE .5; 2.5 MG/3ML; MG/3ML
1 SOLUTION RESPIRATORY (INHALATION)
Qty: 4 | Refills: 0
Start: 2025-06-30 | End: 2025-07-13

## 2025-06-30 RX ADMIN — IPRATROPIUM BROMIDE AND ALBUTEROL SULFATE 3 MILLILITER(S): .5; 2.5 SOLUTION RESPIRATORY (INHALATION) at 01:37

## 2025-06-30 RX ADMIN — Medication 1000 MILLILITER(S): at 01:46

## 2025-06-30 RX ADMIN — IPRATROPIUM BROMIDE AND ALBUTEROL SULFATE 3 MILLILITER(S): .5; 2.5 SOLUTION RESPIRATORY (INHALATION) at 09:37

## 2025-06-30 NOTE — DISCHARGE NOTE NURSING/CASE MANAGEMENT/SOCIAL WORK - FINANCIAL ASSISTANCE
[facility] provides services at a reduced cost to those who are determined to be eligible through [facility]’s financial assistance program. Information regarding [facility]’s financial assistance program can be found by going to [email or web address] or by calling [phone].

## 2025-06-30 NOTE — PROGRESS NOTE ADULT - SUBJECTIVE AND OBJECTIVE BOX
CHIEF COMPLAINT/INTERVAL HISTORY:    Patient is a 30y old  Female who presents with a chief complaint of cough (29 Jun 2025 22:20)      HPI:  Patient is a 30y old  Female who presents with a chief complaint of cough and intermittent fever for 3 days. She has no pertinent medical history. She states that she has been coughing without improvement with night time fevers for the past 3-4 days. She take Tylenol at home and that lowers the fevers. However, she feels that she is getting worse and having some difficulty breathing today. As such, she came into the ED.     PAST MEDICAL & SURGICAL HISTORY:  No pertinent past medical history      S/P tonsillectomy        FAMILY HISTORY:  FH: HTN (hypertension) (Father)  father    FH: hyperlipidemia (Father)  father      Social History:      Allergies    Nuts (Unknown)  penicillins (Other)  codeine (Other)  shellfish (Other (Severe))    Intolerances        MEDICATIONS  (STANDING):  albuterol/ipratropium for Nebulization 3 milliLiter(s) Nebulizer every 6 hours  azithromycin  IVPB 500 milliGRAM(s) IV Intermittent every 24 hours  cefTRIAXone Injectable. 1000 milliGRAM(s) IV Push every 24 hours    MEDICATIONS  (PRN):  acetaminophen     Tablet .. 650 milliGRAM(s) Oral every 6 hours PRN Mild Pain (1 - 3)  aluminum hydroxide/magnesium hydroxide/simethicone Suspension 30 milliLiter(s) Oral every 4 hours PRN Dyspepsia  melatonin 3 milliGRAM(s) Oral at bedtime PRN Insomnia  ondansetron Injectable 4 milliGRAM(s) IV Push every 8 hours PRN Nausea and/or Vomiting      ROS:  General:  fevers  Skin: No rash or bothersome skin lesions  Musculoskeletal: No arthalgias, myalgias or joint swelling  Eyes: No visual changes or eye pain  Ears: No hearing loss , otorrhea or ear pain  Nose, Mouth, Throat: No nasal congestion, rhinorrhea, oral lesions, postnasal drip or sore throat  Cardio: No chest pain or palpitations. no lower extremity edema. no syncope. no claudication.   Respiratory: cough and shortness of breath    GI: No diarrhea, constipation, blood in stools, abdominal pain, vomiting or heartburn  : No urinary frequency, hematuria, incontinence, or dysuria  Neurologic: No headaches, parasthesias, confusion, dysarthria or gait instability  Psychiatric:  No anxiety or depression  Lymphatic:  No easy bruising, easy bleeding or swollen glands  Allergic: No itching, sneezing , watery eyes, clear rhinorrhea or recurrent infections    PEx  T(C): 37 (06-29-25 @ 22:04), Max: 37 (06-29-25 @ 22:04)  HR: 112 (06-29-25 @ 22:04) (112 - 128)  BP: 120/82 (06-29-25 @ 22:04) (118/69 - 134/79)  RR: 18 (06-29-25 @ 22:04) (18 - 19)  SpO2: 99% (06-29-25 @ 22:04) (99% - 100%)  Wt(kg): --  General:     no acute distress, no identifying marks , scars, or tattoos.  Skin: no rash or prominent lesions  Head: normocephalic, atraumatic     Sinuses: non-tender  Nose: no external lesions, mucosa non-inflamed, septum and turbinates normal  Throat: no erythema, exudates or lesions.  Neck: Supple without lymphadenopathy. Thyroid no thyromegaly, no palpable thyroid nodules, no palpable nodules or masses, carotid arteries no bruits.   Breasts: No palpable masses or lesions.  Heart: RRR, no murmur or gallop.  Normal S1, S2.  No S3, S4.   Lungs: decreased air movement in the LLL   Chest wall: Normal insp   Abdomen:  Soft, NT/ND, normal bowel sounds, no HSM, no masses.  No peritoneal signs.   Back: spine normal without deformity or tenderness.  Normal ROM   : Exam normal.  no inguinal hernias.  Extremities: No deformities, clubbing, cyanosis, or edema.  Musculoskeletal: Normal gait and station. No decreased range of motion, instability, atrophy or abnormal strength or tone in the head, neck, spine, ribs, pelvis or extremities.   Neurologic: CN 2-12 normal. Sensation to pain, touch and proprioception normal. DTRs normal in upper and lower extremities. No pathologic reflexes.  Motor normal.  Psychiatric: Oriented X3, intact recent and remote memory, judgement and insight, normal mood and affect.                          14.0   21.67 )-----------( 232      ( 29 Jun 2025 16:53 )             41.7     06-29    137  |  109[H]  |  9   ----------------------------<  101[H]  4.0   |  23  |  0.74    Ca    8.9      29 Jun 2025 16:53    TPro  7.7  /  Alb  3.7  /  TBili  0.5  /  DBili  x   /  AST  15  /  ALT  14  /  AlkPhos  72  06-29    CAPILLARY BLOOD GLUCOSE          Urinalysis Basic - ( 29 Jun 2025 16:53 )    Color: x / Appearance: x / SG: x / pH: x  Gluc: 101 mg/dL / Ketone: x  / Bili: x / Urobili: x   Blood: x / Protein: x / Nitrite: x   Leuk Esterase: x / RBC: x / WBC x   Sq Epi: x / Non Sq Epi: x / Bacteria: x          Radiology/Imaging, I have personally reviewed:  ct chest (29 Jun 2025 22:20)      SUBJECTIVE & OBJECTIVE: Pt seen and examined at bedside.     ICU Vital Signs Last 24 Hrs  T(C): 36.7 (29 Jun 2025 23:06), Max: 37 (29 Jun 2025 22:04)  T(F): 98.1 (29 Jun 2025 23:06), Max: 98.6 (29 Jun 2025 22:04)  HR: 99 (30 Jun 2025 01:37) (99 - 128)  BP: 123/72 (29 Jun 2025 23:06) (118/69 - 134/79)  BP(mean): 93 (29 Jun 2025 22:04) (82 - 93)  ABP: --  ABP(mean): --  RR: 18 (29 Jun 2025 23:06) (18 - 19)  SpO2: 99% (30 Jun 2025 01:37) (99% - 100%)    O2 Parameters below as of 30 Jun 2025 01:37  Patient On (Oxygen Delivery Method): room air              MEDICATIONS  (STANDING):  albuterol/ipratropium for Nebulization 3 milliLiter(s) Nebulizer every 6 hours  azithromycin  IVPB 500 milliGRAM(s) IV Intermittent every 24 hours  cefTRIAXone Injectable. 1000 milliGRAM(s) IV Push every 24 hours    MEDICATIONS  (PRN):  acetaminophen     Tablet .. 650 milliGRAM(s) Oral every 6 hours PRN Mild Pain (1 - 3)  aluminum hydroxide/magnesium hydroxide/simethicone Suspension 30 milliLiter(s) Oral every 4 hours PRN Dyspepsia  melatonin 3 milliGRAM(s) Oral at bedtime PRN Insomnia  ondansetron Injectable 4 milliGRAM(s) IV Push every 8 hours PRN Nausea and/or Vomiting        PHYSICAL EXAM:      NERVOUS SYSTEM:  Alert & Oriented X3, Motor Strength 5/5 B/L upper and lower extremities; DTRs 2+ intact and symmetric  CHEST/LUNG: Clear to auscultation bilaterally; No rales, rhonchi, wheezing, or rubs  HEART: Regular rate and rhythm; No murmurs, rubs, or gallops  ABDOMEN: Soft, Nontender, Nondistended; Bowel sounds present  EXTREMITIES:  2+ Peripheral Pulses, No clubbing, cyanosis, or edema    LABS:                        12.1   14.63 )-----------( 187      ( 30 Jun 2025 06:11 )             35.8     06-30    140  |  115[H]  |  5[L]  ----------------------------<  133[H]  4.1   |  21[L]  |  0.46[L]    Ca    8.8      30 Jun 2025 06:11    TPro  7.7  /  Alb  3.7  /  TBili  0.5  /  DBili  x   /  AST  15  /  ALT  14  /  AlkPhos  72  06-29              RADIOLOGY & ADDITIONAL TESTS: personally reviewed      30/F with no med hx presents with tiny  LLL PNA     PNA, likely gram negative source  Not hypoxic  Ceftriaxone and Azithromycin   Duoneb  Blood cultures pending     Lactic acidosis  LA 5.5   NS 2 L total, second L running now  Recheck LA after NS bolus     SCD for DVT PPx     time spent: 55min

## 2025-06-30 NOTE — CHART NOTE - NSCHARTNOTEFT_GEN_A_CORE
To whom it may concern:               Ms Pat Arenas was hospitalized at [facility] and  is excused from work from    6/30 to   7/7/2025            Sincerely:

## 2025-06-30 NOTE — DISCHARGE NOTE NURSING/CASE MANAGEMENT/SOCIAL WORK - PATIENT PORTAL LINK FT
You can access the FollowMyHealth Patient Portal offered by [facility] by registering at the following website: [email or web address]. By joining [facility]’s FollowMyHealth portal, you will also be able to view your health information using other applications (apps) compatible with our system.

## 2025-06-30 NOTE — DISCHARGE NOTE PROVIDER - CARE PROVIDER_API CALL
Thomas Angelo  Internal Medicine  [address], [address]  [place] [address]  Phone: [phone]  Fax: [phone]  Follow Up Time: 2 weeks

## 2025-06-30 NOTE — DISCHARGE NOTE PROVIDER - NSDCMRMEDTOKEN_GEN_ALL_CORE_FT
cefuroxime 500 mg oral tablet: 1 tab(s) orally 2 times a day  Combivent Respimat 20 mcg-100 mcg/inh inhalation aerosol: 1 inhaled 2 times a day as needed for  shortness of breath and/or wheezing

## 2025-06-30 NOTE — DISCHARGE NOTE PROVIDER - NSDCCPCAREPLAN_GEN_ALL_CORE_FT
PRINCIPAL DISCHARGE DIAGNOSIS  Diagnosis: PNA (pneumonia)  Assessment and Plan of Treatment: take antibiotic as prescribed; you should see PCP for fup, I entered one name if you don't have one

## 2025-06-30 NOTE — PROVIDER CONTACT NOTE (CRITICAL VALUE NOTIFICATION) - SITUATION
MD Sharma made aware, will medicate as per EMR
Patient admitted with Pneumonia and initial lactate was 5.5 . after 1 L   bolus repeat lactate 4.4

## 2025-06-30 NOTE — DISCHARGE NOTE PROVIDER - HOSPITAL COURSE
30/F with no med hx presents with tiny  LLL PNA     PNA, suspect viral, was on zithromax before  Albuterol for home will give her Ceftin for 5 days    Lactic acidosis resolved

## 2025-07-05 DIAGNOSIS — Z88.0 ALLERGY STATUS TO PENICILLIN: ICD-10-CM

## 2025-07-05 DIAGNOSIS — J15.69 PNEUMONIA DUE TO OTHER GRAM-NEGATIVE BACTERIA: ICD-10-CM

## 2025-07-05 DIAGNOSIS — E87.20 ACIDOSIS, UNSPECIFIED: ICD-10-CM

## 2025-07-05 DIAGNOSIS — Z91.013 ALLERGY TO SEAFOOD: ICD-10-CM

## 2025-07-05 DIAGNOSIS — Z91.018 ALLERGY TO OTHER FOODS: ICD-10-CM
